# Patient Record
Sex: MALE | Race: BLACK OR AFRICAN AMERICAN | URBAN - METROPOLITAN AREA
[De-identification: names, ages, dates, MRNs, and addresses within clinical notes are randomized per-mention and may not be internally consistent; named-entity substitution may affect disease eponyms.]

---

## 2018-01-12 ENCOUNTER — HOSPITAL ENCOUNTER (OUTPATIENT)
Dept: RADIATION ONCOLOGY | Age: 68
Discharge: HOME | End: 2018-01-12
Payer: MEDICARE

## 2018-01-12 VITALS
SYSTOLIC BLOOD PRESSURE: 144 MMHG | OXYGEN SATURATION: 94 % | HEART RATE: 71 BPM | RESPIRATION RATE: 14 BRPM | BODY MASS INDEX: 26.1 KG/M2 | DIASTOLIC BLOOD PRESSURE: 106 MMHG | HEIGHT: 68 IN | TEMPERATURE: 97.6 F | WEIGHT: 172.2 LBS

## 2018-01-12 DIAGNOSIS — Z90.79 HISTORY OF RADICAL PROSTATECTOMY: ICD-10-CM

## 2018-01-12 DIAGNOSIS — C61 ADENOCARCINOMA OF PROSTATE (HCC): Primary | ICD-10-CM

## 2018-01-12 PROCEDURE — 99212 OFFICE O/P EST SF 10 MIN: CPT | Performed by: RADIOLOGY

## 2018-01-12 RX ORDER — LEVOTHYROXINE SODIUM 0.07 MG/1
75 TABLET ORAL DAILY
COMMUNITY

## 2018-01-17 PROCEDURE — 99214 OFFICE O/P EST MOD 30 MIN: CPT | Performed by: RADIOLOGY

## 2018-01-17 PROCEDURE — 77290 THER RAD SIMULAJ FIELD CPLX: CPT | Performed by: RADIOLOGY

## 2018-01-17 PROCEDURE — 77334 RADIATION TREATMENT AID(S): CPT | Performed by: RADIOLOGY

## 2018-01-24 PROCEDURE — 77338 DESIGN MLC DEVICE FOR IMRT: CPT | Performed by: RADIOLOGY

## 2018-01-24 PROCEDURE — 77301 RADIOTHERAPY DOSE PLAN IMRT: CPT | Performed by: RADIOLOGY

## 2018-01-24 PROCEDURE — 77300 RADIATION THERAPY DOSE PLAN: CPT | Performed by: RADIOLOGY

## 2018-01-25 ENCOUNTER — HOSPITAL ENCOUNTER (OUTPATIENT)
Dept: LAB | Age: 68
Discharge: HOME | End: 2018-01-25
Payer: MEDICARE

## 2018-01-25 LAB
ATYPICAL LYMPHOCYTE RELATIVE PERCENT: 3 %
BASOPHILS ABSOLUTE: 0 K/UL (ref 0–0.2)
BASOPHILS RELATIVE PERCENT: 0.2 %
EOSINOPHILS ABSOLUTE: 0.1 K/UL (ref 0–0.7)
EOSINOPHILS RELATIVE PERCENT: 2 %
HCT VFR BLD CALC: 41.9 % (ref 42–52)
HEMOGLOBIN: 14.1 G/DL (ref 14–18)
LYMPHOCYTES ABSOLUTE: 1 K/UL (ref 1–4.8)
LYMPHOCYTES RELATIVE PERCENT: 28 %
MCH RBC QN AUTO: 34.6 PG (ref 27–31.3)
MCHC RBC AUTO-ENTMCNC: 33.7 % (ref 33–37)
MCV RBC AUTO: 102.7 FL (ref 80–100)
MONOCYTES ABSOLUTE: 0.4 K/UL (ref 0.2–0.8)
MONOCYTES RELATIVE PERCENT: 10.7 %
NEUTROPHILS ABSOLUTE: 1.8 K/UL (ref 1.4–6.5)
NEUTROPHILS RELATIVE PERCENT: 56 %
PDW BLD-RTO: 13.2 % (ref 11.5–14.5)
PLATELET # BLD: 137 K/UL (ref 130–400)
PLATELET SLIDE REVIEW: NORMAL
PROSTATE SPECIFIC ANTIGEN: 0.05 NG/ML (ref 0–6.22)
RBC # BLD: 4.08 M/UL (ref 4.7–6.1)
RBC # BLD: NORMAL 10*6/UL
SMUDGE CELLS: 3.9
VACUOLATED NEUTROPHILS: ABNORMAL
WBC # BLD: 3.2 K/UL (ref 4.8–10.8)

## 2018-01-25 PROCEDURE — 84153 ASSAY OF PSA TOTAL: CPT

## 2018-01-25 PROCEDURE — 85025 COMPLETE CBC W/AUTO DIFF WBC: CPT

## 2018-01-25 PROCEDURE — 77387 GUIDANCE FOR RADJ TX DLVR: CPT | Performed by: RADIOLOGY

## 2018-01-25 PROCEDURE — 84403 ASSAY OF TOTAL TESTOSTERONE: CPT

## 2018-01-25 PROCEDURE — G0103 PSA SCREENING: HCPCS

## 2018-01-25 PROCEDURE — 77280 THER RAD SIMULAJ FIELD SMPL: CPT | Performed by: RADIOLOGY

## 2018-01-29 PROCEDURE — 77385 HC NTSTY MODUL RAD TX DLVR SMPL: CPT | Performed by: RADIOLOGY

## 2018-01-30 PROCEDURE — 77385 HC NTSTY MODUL RAD TX DLVR SMPL: CPT | Performed by: RADIOLOGY

## 2018-01-30 PROCEDURE — 99212 OFFICE O/P EST SF 10 MIN: CPT | Performed by: RADIOLOGY

## 2018-01-31 PROCEDURE — 77385 HC NTSTY MODUL RAD TX DLVR SMPL: CPT | Performed by: RADIOLOGY

## 2018-02-01 ENCOUNTER — HOSPITAL ENCOUNTER (OUTPATIENT)
Dept: RADIATION ONCOLOGY | Age: 68
Discharge: HOME | End: 2018-02-01
Payer: MEDICARE

## 2018-02-01 PROCEDURE — 77385 HC NTSTY MODUL RAD TX DLVR SMPL: CPT | Performed by: RADIOLOGY

## 2018-02-02 PROCEDURE — 77385 HC NTSTY MODUL RAD TX DLVR SMPL: CPT | Performed by: RADIOLOGY

## 2018-02-02 PROCEDURE — 77336 RADIATION PHYSICS CONSULT: CPT | Performed by: RADIOLOGY

## 2018-02-05 PROCEDURE — 77385 HC NTSTY MODUL RAD TX DLVR SMPL: CPT | Performed by: RADIOLOGY

## 2018-02-06 PROCEDURE — 99212 OFFICE O/P EST SF 10 MIN: CPT | Performed by: RADIOLOGY

## 2018-02-06 PROCEDURE — 77385 HC NTSTY MODUL RAD TX DLVR SMPL: CPT | Performed by: RADIOLOGY

## 2018-02-07 PROCEDURE — 77385 HC NTSTY MODUL RAD TX DLVR SMPL: CPT | Performed by: RADIOLOGY

## 2018-02-08 PROCEDURE — 77385 HC NTSTY MODUL RAD TX DLVR SMPL: CPT | Performed by: RADIOLOGY

## 2018-02-09 PROCEDURE — 77336 RADIATION PHYSICS CONSULT: CPT | Performed by: RADIOLOGY

## 2018-02-09 PROCEDURE — 77385 HC NTSTY MODUL RAD TX DLVR SMPL: CPT | Performed by: RADIOLOGY

## 2018-02-12 PROCEDURE — 77385 HC NTSTY MODUL RAD TX DLVR SMPL: CPT | Performed by: RADIOLOGY

## 2018-02-13 PROCEDURE — 99212 OFFICE O/P EST SF 10 MIN: CPT | Performed by: RADIOLOGY

## 2018-02-13 PROCEDURE — 77385 HC NTSTY MODUL RAD TX DLVR SMPL: CPT | Performed by: RADIOLOGY

## 2018-02-14 PROCEDURE — 77385 HC NTSTY MODUL RAD TX DLVR SMPL: CPT | Performed by: RADIOLOGY

## 2018-02-15 PROCEDURE — 77300 RADIATION THERAPY DOSE PLAN: CPT | Performed by: RADIOLOGY

## 2018-02-15 PROCEDURE — 77385 HC NTSTY MODUL RAD TX DLVR SMPL: CPT | Performed by: RADIOLOGY

## 2018-02-16 PROCEDURE — 77336 RADIATION PHYSICS CONSULT: CPT | Performed by: RADIOLOGY

## 2018-02-16 PROCEDURE — 77385 HC NTSTY MODUL RAD TX DLVR SMPL: CPT | Performed by: RADIOLOGY

## 2018-02-19 PROCEDURE — 77385 HC NTSTY MODUL RAD TX DLVR SMPL: CPT | Performed by: RADIOLOGY

## 2018-02-20 PROCEDURE — 77385 HC NTSTY MODUL RAD TX DLVR SMPL: CPT | Performed by: RADIOLOGY

## 2018-02-20 PROCEDURE — 99212 OFFICE O/P EST SF 10 MIN: CPT | Performed by: RADIOLOGY

## 2018-02-21 PROCEDURE — 77385 HC NTSTY MODUL RAD TX DLVR SMPL: CPT | Performed by: RADIOLOGY

## 2018-02-22 PROCEDURE — 77385 HC NTSTY MODUL RAD TX DLVR SMPL: CPT | Performed by: RADIOLOGY

## 2018-02-23 PROCEDURE — 77385 HC NTSTY MODUL RAD TX DLVR SMPL: CPT | Performed by: RADIOLOGY

## 2018-02-23 PROCEDURE — 77336 RADIATION PHYSICS CONSULT: CPT | Performed by: RADIOLOGY

## 2018-02-26 PROCEDURE — 77385 HC NTSTY MODUL RAD TX DLVR SMPL: CPT | Performed by: RADIOLOGY

## 2018-02-27 PROCEDURE — 77385 HC NTSTY MODUL RAD TX DLVR SMPL: CPT | Performed by: RADIOLOGY

## 2018-02-28 PROCEDURE — 77385 HC NTSTY MODUL RAD TX DLVR SMPL: CPT | Performed by: RADIOLOGY

## 2018-02-28 PROCEDURE — 99212 OFFICE O/P EST SF 10 MIN: CPT | Performed by: RADIOLOGY

## 2018-03-01 ENCOUNTER — HOSPITAL ENCOUNTER (OUTPATIENT)
Dept: RADIATION ONCOLOGY | Age: 68
Discharge: HOME | End: 2018-03-01
Payer: MEDICARE

## 2018-03-01 PROCEDURE — 77385 HC NTSTY MODUL RAD TX DLVR SMPL: CPT | Performed by: RADIOLOGY

## 2018-03-02 PROCEDURE — 77385 HC NTSTY MODUL RAD TX DLVR SMPL: CPT | Performed by: RADIOLOGY

## 2018-03-02 PROCEDURE — 77336 RADIATION PHYSICS CONSULT: CPT | Performed by: RADIOLOGY

## 2018-03-05 ENCOUNTER — HOSPITAL ENCOUNTER (OUTPATIENT)
Dept: RADIATION ONCOLOGY | Age: 68
Discharge: HOME | End: 2018-03-05
Payer: MEDICARE

## 2018-03-05 PROCEDURE — 77385 HC NTSTY MODUL RAD TX DLVR SMPL: CPT | Performed by: RADIOLOGY

## 2018-03-06 PROCEDURE — 99212 OFFICE O/P EST SF 10 MIN: CPT | Performed by: RADIOLOGY

## 2018-03-06 PROCEDURE — 77385 HC NTSTY MODUL RAD TX DLVR SMPL: CPT | Performed by: RADIOLOGY

## 2018-03-07 PROCEDURE — 77385 HC NTSTY MODUL RAD TX DLVR SMPL: CPT | Performed by: RADIOLOGY

## 2018-03-08 PROCEDURE — 77385 HC NTSTY MODUL RAD TX DLVR SMPL: CPT | Performed by: RADIOLOGY

## 2018-03-09 PROCEDURE — 77385 HC NTSTY MODUL RAD TX DLVR SMPL: CPT | Performed by: RADIOLOGY

## 2018-03-09 PROCEDURE — 77336 RADIATION PHYSICS CONSULT: CPT | Performed by: RADIOLOGY

## 2018-03-12 PROCEDURE — 77385 HC NTSTY MODUL RAD TX DLVR SMPL: CPT | Performed by: RADIOLOGY

## 2018-03-13 PROCEDURE — 77385 HC NTSTY MODUL RAD TX DLVR SMPL: CPT | Performed by: RADIOLOGY

## 2018-03-13 PROCEDURE — 99212 OFFICE O/P EST SF 10 MIN: CPT | Performed by: RADIOLOGY

## 2018-03-14 PROCEDURE — 77385 HC NTSTY MODUL RAD TX DLVR SMPL: CPT | Performed by: RADIOLOGY

## 2018-03-15 PROCEDURE — 77385 HC NTSTY MODUL RAD TX DLVR SMPL: CPT | Performed by: RADIOLOGY

## 2018-03-16 PROCEDURE — 77336 RADIATION PHYSICS CONSULT: CPT | Performed by: RADIOLOGY

## 2018-03-16 PROCEDURE — 77385 HC NTSTY MODUL RAD TX DLVR SMPL: CPT | Performed by: RADIOLOGY

## 2018-03-19 PROCEDURE — 77385 HC NTSTY MODUL RAD TX DLVR SMPL: CPT | Performed by: RADIOLOGY

## 2018-03-20 PROCEDURE — 99213 OFFICE O/P EST LOW 20 MIN: CPT | Performed by: RADIOLOGY

## 2018-03-20 PROCEDURE — 77385 HC NTSTY MODUL RAD TX DLVR SMPL: CPT | Performed by: RADIOLOGY

## 2018-03-21 PROCEDURE — 77385 HC NTSTY MODUL RAD TX DLVR SMPL: CPT | Performed by: RADIOLOGY

## 2018-03-22 PROCEDURE — 77385 HC NTSTY MODUL RAD TX DLVR SMPL: CPT | Performed by: RADIOLOGY

## 2018-03-27 PROCEDURE — 77336 RADIATION PHYSICS CONSULT: CPT | Performed by: RADIOLOGY

## 2018-03-29 ENCOUNTER — HOSPITAL ENCOUNTER (OUTPATIENT)
Dept: LAB | Age: 68
Discharge: HOME | End: 2018-03-29
Payer: MEDICARE

## 2018-03-29 LAB
ANISOCYTOSIS: ABNORMAL
BASOPHILS ABSOLUTE: 0 K/UL (ref 0–0.2)
BASOPHILS RELATIVE PERCENT: 0.6 %
EOSINOPHILS ABSOLUTE: 0 K/UL (ref 0–0.7)
EOSINOPHILS RELATIVE PERCENT: 1.8 %
HCT VFR BLD CALC: 37.4 % (ref 42–52)
HEMOGLOBIN: 12.6 G/DL (ref 14–18)
LYMPHOCYTES ABSOLUTE: 0.7 K/UL (ref 1–4.8)
LYMPHOCYTES RELATIVE PERCENT: 32 %
MACROCYTES: ABNORMAL
MCH RBC QN AUTO: 35.6 PG (ref 27–31.3)
MCHC RBC AUTO-ENTMCNC: 33.8 % (ref 33–37)
MCV RBC AUTO: 105.4 FL (ref 80–100)
MONOCYTES ABSOLUTE: 0.4 K/UL (ref 0.2–0.8)
MONOCYTES RELATIVE PERCENT: 17.5 %
NEUTROPHILS ABSOLUTE: 1.1 K/UL (ref 1.4–6.5)
NEUTROPHILS RELATIVE PERCENT: 48.1 %
PDW BLD-RTO: 13.6 % (ref 11.5–14.5)
PLATELET # BLD: 131 K/UL (ref 130–400)
PLATELET SLIDE REVIEW: ADEQUATE
RBC # BLD: 3.54 M/UL (ref 4.7–6.1)
SLIDE REVIEW: ABNORMAL
VACUOLATED NEUTROPHILS: PRESENT
WBC # BLD: 2.3 K/UL (ref 4.8–10.8)

## 2018-03-29 PROCEDURE — 85025 COMPLETE CBC W/AUTO DIFF WBC: CPT

## 2018-06-21 ENCOUNTER — HOSPITAL ENCOUNTER (OUTPATIENT)
Dept: RADIATION ONCOLOGY | Age: 68
Discharge: HOME | End: 2018-06-21
Payer: MEDICARE

## 2018-06-21 VITALS
HEART RATE: 79 BPM | OXYGEN SATURATION: 98 % | DIASTOLIC BLOOD PRESSURE: 80 MMHG | TEMPERATURE: 97.5 F | SYSTOLIC BLOOD PRESSURE: 129 MMHG | HEIGHT: 68 IN | RESPIRATION RATE: 18 BRPM

## 2018-06-21 DIAGNOSIS — Z90.79 HISTORY OF RADICAL PROSTATECTOMY: Primary | ICD-10-CM

## 2018-06-21 DIAGNOSIS — C61 ADENOCARCINOMA OF PROSTATE (HCC): ICD-10-CM

## 2018-06-21 PROCEDURE — 99213 OFFICE O/P EST LOW 20 MIN: CPT | Performed by: RADIOLOGY

## 2019-07-08 ENCOUNTER — HOSPITAL ENCOUNTER (OUTPATIENT)
Dept: LAB | Age: 69
Discharge: HOME | End: 2019-07-08
Payer: MEDICARE

## 2019-07-08 LAB — PROSTATE SPECIFIC ANTIGEN: 0.01 NG/ML (ref 0–6.22)

## 2019-07-08 PROCEDURE — 84153 ASSAY OF PSA TOTAL: CPT

## 2019-07-15 ENCOUNTER — HOSPITAL ENCOUNTER (OUTPATIENT)
Dept: RADIATION ONCOLOGY | Age: 69
Discharge: HOME | End: 2019-07-15
Payer: MEDICARE

## 2019-07-15 VITALS
DIASTOLIC BLOOD PRESSURE: 86 MMHG | RESPIRATION RATE: 14 BRPM | HEART RATE: 66 BPM | TEMPERATURE: 96.2 F | SYSTOLIC BLOOD PRESSURE: 128 MMHG | BODY MASS INDEX: 25.51 KG/M2 | WEIGHT: 167.8 LBS

## 2019-07-15 DIAGNOSIS — C61 ADENOCARCINOMA OF PROSTATE (HCC): Primary | ICD-10-CM

## 2019-07-15 PROCEDURE — 99212 OFFICE O/P EST SF 10 MIN: CPT | Performed by: RADIOLOGY

## 2019-07-15 NOTE — ONCOLOGY
RADIATION ONCOLOGY FOLLOW-UP    DATE OF VISIT: 7/15/2019    Patient Active Problem List   Diagnosis Code    Adenocarcinoma of prostate ([facility] [address] C61    Pure hypercholesterolemia E78.00    History of colonic polyps Z86.010    Senile cataracts of both eyes H25.9    History of radical prostatectomy Z90.79    Vitreous floaters of both eyes H38.65    Diverticulosis of colon K57.30    Prediabetes R73.03       DIAGNOSIS: [place] 4+3=7 PSA 7, [id number]. PSA never vanished, now slowly increasing to 0.26.  Lupron started 11/17.      PRIOR TREATMENT: 70.2 Gy delivered at 1.8 Gy per day, with concurrent Lupron (6 months duration). Treatment Dates:  1/29/18 - 3/22/18     TIME SINCE TREATMENT: 16 months.     INTERVAL HISTORY: Patient continues to do well, with a stable IPSS score of 3, with nocturia ×1 and some occasional urgency. No dysuria or hematuria. No urinary incontinence, and the patient no longer uses pads or diapers. PSA [place] of [place] was less than 0.01. Recent PSA at [facility] was 0.01. I would repeat this in 6 months before deciding excepting it as real.  No rectal bleeding or other GI symptoms.     PAST MEDICAL HISTORY:   Past Medical History:   Diagnosis Date    Adenocarcinoma of prostate ([facility] [address]     [place] score = 7 (9/2010)    Diverticulosis large intestine w/o perforation or abscess w/bleeding     Diverticulosis of colon     5/12/2011     GERD (gastroesophageal reflux disease)     History of colonic polyps     5/2011, 8/19/14 (repeat in 3-5 years)    History of radical prostatectomy     Dec 2010    History of tobacco use     Quit at age 23    Hyperlipidemia 11/1/2010    Simvastatin 40 mg qd, ASA 81 mg qd     Hypothyroidism     Leukopenia     WBC = 3.3 (chronic)    Prediabetes 6/13/2015    Hgb A1c = 6.l (6/2015)     Type 2 diabetes mellitus without complication ([facility] [address]     borderline       PAST SURGICAL HISTORY:  Past Surgical History:   Procedure Laterality Date    COLONOSCOPY  2014    HERNIA REPAIR Left     inguinal    PROSTATE SURGERY  2010    robotic prostatectomy      [facility]        Social History     Tobacco Use   Smoking Status Former Smoker    Packs/day: 0.50    Years: 10.00    Pack years: 5.00    Last attempt to quit: 1974    Years since quittin.5   Smokeless Tobacco Never Used     Social History     Substance and Sexual Activity   Alcohol Use No       ALLERGIES:   Allergies   Allergen Reactions    Penicillins Hives        CURRENT MEDICATIONS:     Prior to Admission medications    Medication Sig Start Date End Date Taking? Authorizing Provider   Sildenafil Citrate (VIAGRA PO) Take 1 tablet by mouth as needed   Yes Historical Provider, MD   levothyroxine (SYNTHROID) 75 MCG tablet Take 75 mcg by mouth Daily   Yes Historical Provider, MD   simvastatin (ZOCOR) 40 MG tablet Take 1 tablet by mouth nightly (for cholesterol) 16  Yes Alan Perry MD   aspirin EC 81 MG EC tablet Take 1 tablet by mouth daily to prevent heart attack and stroke 6/11/15  Yes Alan Perry MD     ECOG PERFORMANCE STATUS: 0    VITAL SIGNS:    Vitals:    07/15/19 1156   BP: 128/86   Pulse: 66   Resp: 14   Temp: 96.2 °F (35.7 °C)   Weight: 167 lb 12.8 oz (76.1 kg)     PHYSICAL EXAMINATION:  GENERAL: No acute distress. Alert, oriented, cooperative. HEENT:  PERRLA, EOMI. Oral cavity WNL. NECK:  No cervical or supraclavicular adenopathy. CHEST/LUNGS: CTA, no rib or spine tenderness. CARDIOVASCULAR:  RRR, no audible murmur  ABDOMEN:  Nontender, nondistended, normal bowel sounds, soft, no masses  EXTREMITIES: No C/C/E   RECTAL: deferred. STUDIES: PSA 0.01, [facility], 19    IMPRESSION/PLAN:  PSA at the lower limit of detection. No significant symptoms related to treatment. I recommend the patient continue checking his PSA every 6 months, and do this at the same lab for consistency (at [facility] with his PMD). I will see him back in one year.       Electronically signed by Carmenza Webster MD on 7/15/19 at

## 2020-07-22 ENCOUNTER — HOSPITAL ENCOUNTER (OUTPATIENT)
Dept: LAB | Age: 70
Discharge: HOME | End: 2020-07-22
Payer: MEDICARE

## 2020-07-22 ENCOUNTER — HOSPITAL ENCOUNTER (OUTPATIENT)
Dept: RADIATION ONCOLOGY | Age: 70
Discharge: HOME | End: 2020-07-22
Payer: MEDICARE

## 2020-07-22 LAB — PROSTATE SPECIFIC ANTIGEN: 0.01 NG/ML (ref 0–6.22)

## 2020-07-29 ENCOUNTER — HOSPITAL ENCOUNTER (OUTPATIENT)
Dept: RADIATION ONCOLOGY | Age: 70
Discharge: HOME | End: 2020-07-29
Payer: MEDICARE

## 2020-07-29 VITALS
TEMPERATURE: 99.5 F | DIASTOLIC BLOOD PRESSURE: 85 MMHG | BODY MASS INDEX: 25.32 KG/M2 | HEART RATE: 85 BPM | WEIGHT: 166.5 LBS | RESPIRATION RATE: 16 BRPM | SYSTOLIC BLOOD PRESSURE: 127 MMHG

## 2020-07-29 PROCEDURE — 99212 OFFICE O/P EST SF 10 MIN: CPT | Performed by: RADIOLOGY

## 2020-07-29 NOTE — ONCOLOGY
RADIATION ONCOLOGY FOLLOW-UP    DATE OF VISIT: 2020    DIAGNOSIS & STAGING: Cheryl 4+3=7 PSA 7, [id number]. PSA never vanished, now slowly increasing to 0.26.  Lupron started .      PRIOR TREATMENT: 70.2 Gy delivered at 1.8 Gy per day, with concurrent Lupron (6 months duration).  Treatment Dates:  18 - 3/22/18     TIME SINCE TREATMENT: 2.5 years     INTERVAL HISTORY: Patient returns today with a persistently low PSA of 0.01, at the lower limit of the assay at [facility]. In  he had a PSA at [facility] that was less than 0.01.  No dysuria or hematuria.  He notes mild stress incontinence, no longer using pads or diapers. No hematuria or rectal bleeding. No dysuria. He primarily follows at the [address] [place] [address], no longer sees Urology.           PAST MEDICAL HISTORY:   Past Medical History:   Diagnosis Date    Adenocarcinoma of prostate ([facility] [address]     [place] score = 7 (2010)    Diverticulosis large intestine w/o perforation or abscess w/bleeding     Diverticulosis of colon     2011     GERD (gastroesophageal reflux disease)     History of colonic polyps     2011, 14 (repeat in 3-5 years)    History of radical prostatectomy     Dec 2010    History of tobacco use     Quit at age 23    Hyperlipidemia 2010    Simvastatin 40 mg qd, ASA 81 mg qd     Hypothyroidism     Leukopenia     WBC = 3.3 (chronic)    Prediabetes 2015    Hgb A1c = 6.l (2015)     Type 2 diabetes mellitus without complication ([facility] [address]     borderline       PAST SURGICAL HISTORY:  Past Surgical History:   Procedure Laterality Date    COLONOSCOPY      HERNIA REPAIR Left 2000    inguinal    PROSTATE SURGERY  2010    robotic prostatectomy      [facility]        Social History     Tobacco Use   Smoking Status Former Smoker    Packs/day: 0.50    Years: 10.00    Pack years: 5.00    Last attempt to quit: 1974    Years since quittin.6   Smokeless Tobacco Never Used     Social History     Substance and Sexual Activity   Alcohol CARDIOVASCULAR:  RRR, no audible murmur  ABDOMEN:  Nontender, nondistended, normal bowel sounds, soft, no masses  EXTREMITIES: No C/C/E   RECTAL: deferred. Nearly vanishing PSA    STUDIES: PSA 0.01 on 7/22/20, same as 6m/a    IMPRESSION/PLAN:    It is unclear what the barely detectable PSA means. If remains at this low level indefinitely the patient can be considered free of disease. I will see him back in 6 months. PSA is being done through his other physicians. Electronically signed by Ildefonso Lara MD on 7/29/20 at 2:26 PM EDT      Thank you for allowing us to participate in the care of this patient.   cc:   No att. providers found  Mary Gongora, APRN - [address], MD  [place] [facility]

## 2020-07-29 NOTE — ONCOLOGY
NURSING ASSESSMENT     Date: 7/29/2020        Patient Name: Jesusita Webster     YOB: 1950      Age:  68 y.o. MRN: [id number]     Chaperone [] Yes   [x] No      Advance Directives:   Do you currently have completed advance directives (living will)? [] Yes   [x] No         *If yes, please bring us a copy for your records. *If no, would you like info or assistance in completing advance directives (living will)? [] Yes   [x] No    Pain Score: 0      Is pain affecting your ability to take care of yourself or move throughout your home?                         [] Yes   [x] No    General:   Patient has gained weight [] Yes   [x] No  Patient has lost weight [] Yes   [x] No  How much weight in pounds and over what length of time:     Eyes (Ophthalmic): denies     Skin (Dermatological): denies     ENT: denies     Respiratory: denies     Cardiovascular: denies      Device   [] Yes   [x] No   Copy of Card Obtained [] Yes   [x] No    Gastrointestinal: denies  Genito-Urinary: IPPS  Breast: denies   Musculoskeletal: denies  Neurological: denies    Hematological and Lymphatic: denies   Endocrine: synthroid    [facility]-and work provides a NP.    PSA 0.01  7/22/20

## 2021-07-28 ENCOUNTER — HOSPITAL ENCOUNTER (OUTPATIENT)
Dept: RADIATION ONCOLOGY | Age: 71
Discharge: HOME | End: 2021-07-28
Payer: MEDICARE

## 2021-07-28 VITALS
HEART RATE: 85 BPM | WEIGHT: 167.6 LBS | DIASTOLIC BLOOD PRESSURE: 80 MMHG | OXYGEN SATURATION: 99 % | BODY MASS INDEX: 25.4 KG/M2 | SYSTOLIC BLOOD PRESSURE: 126 MMHG | TEMPERATURE: 97.8 F | HEIGHT: 68 IN | RESPIRATION RATE: 16 BRPM

## 2021-07-28 PROCEDURE — 99212 OFFICE O/P EST SF 10 MIN: CPT | Performed by: RADIOLOGY

## 2021-07-28 PROCEDURE — 99214 OFFICE O/P EST MOD 30 MIN: CPT | Performed by: RADIOLOGY

## 2021-07-28 RX ORDER — SILDENAFIL 100 MG/1
50 TABLET, FILM COATED ORAL PRN
COMMUNITY
End: 2022-07-27

## 2021-07-28 NOTE — PROGRESS NOTES
DEPARTMENT OF RADIATION ONCOLOGY   Follow up visit        2021    NAME:  Aurea Dickey    :  1950 65 y.o. male     PCP: ANUSHA Greenberg CNP    REFERRING PROVIDER: Lata    DIAGNOSIS & STAGING: Cheryl 4+3=7 PSA 7, [id number]. PSA never vanished after 2010 prostatectomy with extra-prostatic extension and extensive positive margins, PSA slowly increasing to 0.26 in 2017, Lupron starting then.      PRIOR TREATMENT: 70.2 Gy delivered at 1.8 Gy per day, with concurrent Lupron (6 months duration).  Treatment Dates:  18 - 3/22/18    RECENT HISTORY: Aurea Dickey returns for follow up for the above diagnosis and treatment history. He was last seen here on 2020, with 2020 PSA being 0.01. The patient is no longer on Lupron, and 2021, PSA at the [facility] was less than 0.04. His IPSS score is 3. He reports occasional Viagra, which he would use more frequently if it were not for the side effect of headaches. He has urinary urgency, and occasional rectal urgency. Past medical, surgical, social and family histories reviewed and updated as indicated. ALLERGIES:  Penicillins       MEDICATIONS:   Current Outpatient Medications:     sildenafil (VIAGRA) 100 MG tablet, Take 50 mg by mouth as needed for Erectile Dysfunction, Disp: , Rfl:     levothyroxine (SYNTHROID) 75 MCG tablet, Take 75 mcg by mouth Daily, Disp: , Rfl:     simvastatin (ZOCOR) 40 MG tablet, Take 1 tablet by mouth nightly (for cholesterol), Disp: 90 tablet, Rfl: 1    REVIEW OF SYSTEMS:  Obtained from the patient, chart review and nursing assessment. ECOG Performance Status 0  Constitutional: Good appetite, no weight loss. HEENT:  No headache or visual symptoms but he has bilateral tinnitus. Skin: He reports having an appointment with dermatology to evaluate a rash. Respiratory:  No cough or hemoptysis.   Cardiovascular:  No chest pain, orthopnea, or paroxysmal nocturnal dyspnea  GI:  No incontinence, hematochezia, or diarrhea, but he has occasional rectal urgency. :  No incontinence, hematuria, or dysuria, but he has urinary urgency. Lymph:  No edema  Neuro:  No pain, paresis, or paresthesia  Psychiatric:  No psychiatric illness    PHYSICAL EXAMINATION:      Vitals:    07/28/21 1507   BP: 126/80   Pulse: 85   Resp: 16   Temp: 97.8 °F (36.6 °C)   SpO2: 99%   Weight: 167 lb 9.6 oz (76 kg)   Height: 5' 8\" (1.727 m)       Wt Readings from Last 3 Encounters:   07/28/21 167 lb 9.6 oz (76 kg)   07/29/20 166 lb 8 oz (75.5 kg)   07/15/19 167 lb 12.8 oz (76.1 kg)       ECOG PERFORMANCE STATUS:  0    Constitutional: 65 y.o. male who is alert, cooperative and in no apparent distress. HEENT:   No jaundice or icterus. Pupils are equal and reactive. Cranial nerves II-XII are grossly intact. Lymph: No palpable adenopathy in the neck, supraclavicular, infraclavicular, or axillary regions. Heart Exam shows regular rate and rhythm without murmurs, rubs, or gallop. Lungs  are clear to auscultation. Abdomen exam shows a non-distended non-tender abdomen with positive bowel sounds. Extremities:   No edema, clubbing, or cyanosis. Neuro Exam:  Shows strength 5/5 proximally and distally in all 4 extremities, and sensory is grossly normal for light touch and deep pressure. Rectal exam: Shows no external hemorrhoids, normal sphincter tone, an empty prostate bed with no suspiciously palpable lesions. ASSESSMENT/PLAN: The patient is doing well, clinically CAIN, with undetectable PSA. The patient reports that his PSA evaluations are performed at the [facility].  I have asked him to return for follow-up here in approximately 1 year.       Dory Colon MD PhD  Radiation Oncologist  Diplomate, American Board of Radiology -- Radiation Oncology    Department of [address] [place] [address] -- [facility]  [address] [facility]  [address] [address] [place]

## 2021-07-28 NOTE — PROGRESS NOTES
NURSING ASSESSMENT     Date: 7/28/2021        Patient Name: Randall Zazueta     YOB: 1950      Age:  65 y.o. MRN: [id number]     Chaperone [] Yes   [x] No      Advance Directives:   Do you currently have completed advance directives (living will)? [x] Yes   [] No         *If yes, please bring us a copy for your records. *If no, would you like info or assistance in completing advance directives (living will)? [] Yes   [x] No    Pain Score:   Pain Score (1-10): none     General: No Problems  Patient has gained weight [] Yes   [x] No  Patient has lost weight [] Yes   [x] No  How much weight in pounds and over what length of time:     Eyes (Ophthalmic): wears bifocals     Skin (Dermatological): Rash across chest, has a dermatology appt in a month at [facility]      ENT: has bilateral ringing in ears     Respiratory: No Problems     Cardiovascular: No Problems      Device   [] Yes   [x] No   Copy of Card Obtained [] Yes   [x] No    Gastrointestinal: occasional urgency of stool     Genito-Urinary: IPSS =3, has urgency of urination, takes viagra prn     Breast: No Problems     Musculoskeletal: No Problems    Neurological: No Problems, has headaches at times after taking viagra      Hematological and Lymphatic: No Problems     Endocrine: Diabetes Mellitus and Thyroid Problems, diet controlled DM and takes synthroid for hypothroid    A 10-point review of systems has been conducted and pertinent positives have been   recorded. All other review of systems are negative    Was the patient admitted during the course of treatment OR within 30 days of treatment?  no    Additional Comments: PSA done 7/22/21 was 0.01

## 2022-07-27 ENCOUNTER — HOSPITAL ENCOUNTER (OUTPATIENT)
Dept: RADIATION ONCOLOGY | Age: 72
Discharge: HOME | End: 2022-07-27
Payer: MEDICARE

## 2022-07-27 VITALS
BODY MASS INDEX: 24.24 KG/M2 | SYSTOLIC BLOOD PRESSURE: 124 MMHG | HEART RATE: 81 BPM | WEIGHT: 159.4 LBS | DIASTOLIC BLOOD PRESSURE: 79 MMHG | OXYGEN SATURATION: 99 % | RESPIRATION RATE: 16 BRPM | TEMPERATURE: 98.2 F

## 2022-07-27 DIAGNOSIS — C61 ADENOCARCINOMA OF PROSTATE (HCC): Primary | ICD-10-CM

## 2022-07-27 PROCEDURE — 99212 OFFICE O/P EST SF 10 MIN: CPT | Performed by: RADIOLOGY

## 2022-07-27 PROCEDURE — 99213 OFFICE O/P EST LOW 20 MIN: CPT | Performed by: RADIOLOGY

## 2022-07-27 NOTE — PROGRESS NOTES
NURSING ASSESSMENT     Date: 7/27/2022        Patient Name: Coleen Mclaughlin     YOB: 1950      Age:  66 y.o. MRN: [id number]       Chaperone [] Yes   [x] No      Advance Directives:   Do you currently have completed advance directives (living will)? [x] Yes   [] No         *If yes, please bring us a copy for your records. *If no, would you like info or assistance in completing advance directives (living will)? [] Yes   [x] No    Pain Score:   Pain Score (1-10): Denies   Pain Location: denies   Pain Duration: n/a   Pain Management/Control: n/a      Is pain affecting your ability to take care of yourself or move throughout your home? [] Yes   [x] No    General: No Problems  Patient has gained weight [] Yes   [x] No  Patient has lost weight [x] Yes   [] No  How much weight in pounds and over what length of time: Down 8 lbs since last visit 7/28/21    Eyes (Ophthalmic): Wears glasses     Skin (Dermatological): No Problems     ENT: Difficulty Swallowing/Chewing when eats too fast. Ringing in both ears for couple years. Respiratory: No Problems     Cardiovascular: No Problems      Device   [] Yes   [x] No   Copy of Card Obtained [] Yes   [x] No    Gastrointestinal: No Problems    Genito-Urinary:  See IPSS sheet      Urinary leakage if doesn't get to the bathroom fast enough   Nocturia X 2    Impotence       Breast: No Problems     Musculoskeletal: Back Pain after physical activity on occasion. Ongoing pulled muscle to left calf with activity at times. Neurological: Tingling to left hand on occasion      Hematological and Lymphatic: No Problems     Endocrine: Thyroid Problems, pre-diabetes        A 10-point review of systems  has been conducted and pertinent positives have been   recorded. All other review of systems are negative    Was the patient admitted during the course of treatment OR within 30 days of treatment?  No        Additional Comments: Last PSA at the [facility] 5/10/22 < 0.04

## 2022-09-03 NOTE — PROGRESS NOTES
[facility]           Radiation Oncology      [address] [facility]        [address]        Jillian Stauffer: [phone]        F: [phone]       [email or web address]                   Dr. Alona Jones MD PhD    FOLLOW-UP NOTE     Date of Service: 2022  Patient ID: Chente Renteria   : 1950  MRN: [id number]   Acct Number: [de-identified]       NAME:  Chente Renteria    :  1950 74 y.o. male     PCP: ANUSHA Diaz - CNP    REFERRING PROVIDER: Margarita Hughes    DIAGNOSIS:  1. Adenocarcinoma of prostate ([facility] [address]        STAGING: Cancer Staging  Adenocarcinoma of prostate [facility])  Staging form: Prostate, AJCC 7th Edition  - Pathologic: T3, N0, cM0 - Signed by Alona Jones MD on 9/3/2022      DIAGNOSIS & STAGING: [place] 4+3=7 PSA 7, [id number]. PSA never vanished after 2010 prostatectomy with extra-prostatic extension and extensive positive margins, PSA slowly increasing to 0.26 in 2017, Lupron starting then. PRIOR TREATMENT: 70.2 Gy delivered at 1.8 Gy per day, with concurrent Lupron (6 months duration). Treatment Dates:  18 - 3/22/18     RECENT HISTORY: Chente Renteria returns for follow up for the above diagnosis and treatment history. His most recent PSA on 5/10/2020 was less than 0.04 and was done at the [place]. Symptomatically he notes today some incontinence if he does not make it to the restroom in time. He does not wear any pads daily. He notes this urge incontinence only a few times per week. He otherwise has some erectile dysfunction which has been a longstanding problem for him. This is particularly distressing for him. He denies any blood in the stool, diarrhea, or constipation. Past medical, surgical, social and family histories reviewed and updated as indicated.     ALLERGIES:  Penicillins       MEDICATIONS:   Current Outpatient Medications:     levothyroxine (SYNTHROID) 75 MCG tablet, Take 75 mcg by mouth Daily, Disp: , Rfl:     simvastatin (ZOCOR) 40 MG tablet, Take 1 tablet by mouth nightly (for cholesterol), Disp: 90 tablet, Rfl: 1    Review of Systems   All other systems reviewed and are negative. PHYSICAL EXAMINATION:      Vitals:    07/27/22 1127   BP: 124/79   Pulse: 81   Resp: 16   Temp: 98.2 °F (36.8 °C)   SpO2: 99%   Weight: 159 lb 6.4 oz (72.3 kg)       Wt Readings from Last 3 Encounters:   07/27/22 159 lb 6.4 oz (72.3 kg)   07/28/21 167 lb 9.6 oz (76 kg)   07/29/20 166 lb 8 oz (75.5 kg)       ECOG PERFORMANCE STATUS:  0     Constitutional: 65 y.o. male who is alert, cooperative and in no apparent distress. HEENT:   No jaundice or icterus. Pupils are equal and reactive. Cranial nerves II-XII are grossly intact. Lymph: No palpable adenopathy in the neck, supraclavicular, infraclavicular, or axillary regions. Heart Exam shows regular rate and rhythm without murmurs, rubs, or gallop. Lungs  are clear to auscultation. Abdomen exam shows a non-distended non-tender abdomen with positive bowel sounds. Extremities:   No edema, clubbing, or cyanosis. Neuro Exam:  Shows strength 5/5 proximally and distally in all 4 extremities, and sensory is grossly normal for light touch and deep pressure. Rectal exam: Shows no external hemorrhoids, normal sphincter tone, an empty prostate bed with no suspiciously palpable lesions. ASSESSMENT/PLAN: This is a 70-year-old gentleman with a history of pT3 N0 M0 Bowen 7 (4+3) prostate adenocarcinoma with a pretreatment PSA of 7. He underwent radical prostatectomy followed by radiation therapy to the PTV fossa and residual seminal vesicle, PTV [place]oss[place] boost to 7020 centigrade completed on 3/22/2018. He returns for routine follow-up. His PSA is still quite low with a value of less than 0.04 which is consistent with prior values. I reviewed that this is quite reassuring and we will have him return in 1 year for a routine follow-up with a repeat PSA.   He will likely get this drawn at the [facility] where he gets the majority of his care. ORDERS: Serum PSA    FOLLOW-UP: Return for follow-up in 1 year.     Edy Hernandez MD PhD  Radiation Oncologist, [facility]

## 2023-07-12 ENCOUNTER — HOSPITAL ENCOUNTER (OUTPATIENT)
Dept: LAB | Age: 73
Discharge: HOME | End: 2023-07-12
Payer: MEDICARE

## 2023-07-12 LAB — PSA SERPL-MCNC: <0.01 NG/ML (ref 0–4)

## 2023-07-12 PROCEDURE — 84153 ASSAY OF PSA TOTAL: CPT

## 2023-07-12 PROCEDURE — 36415 COLL VENOUS BLD VENIPUNCTURE: CPT

## 2023-07-25 ENCOUNTER — HOSPITAL ENCOUNTER (OUTPATIENT)
Dept: RADIATION ONCOLOGY | Age: 73
Discharge: HOME | End: 2023-07-25
Payer: MEDICARE

## 2023-07-25 VITALS
TEMPERATURE: 97.3 F | WEIGHT: 161.2 LBS | BODY MASS INDEX: 24.51 KG/M2 | RESPIRATION RATE: 16 BRPM | OXYGEN SATURATION: 96 % | HEART RATE: 60 BPM | DIASTOLIC BLOOD PRESSURE: 85 MMHG | SYSTOLIC BLOOD PRESSURE: 146 MMHG

## 2023-07-25 DIAGNOSIS — C61 ADENOCARCINOMA OF PROSTATE (HCC): Primary | ICD-10-CM

## 2023-07-25 PROCEDURE — 99214 OFFICE O/P EST MOD 30 MIN: CPT | Performed by: RADIOLOGY

## 2023-07-25 PROCEDURE — 99212 OFFICE O/P EST SF 10 MIN: CPT | Performed by: RADIOLOGY

## 2024-06-24 ENCOUNTER — DOCUMENTATION (OUTPATIENT)
Dept: HEMATOLOGY/ONCOLOGY | Facility: HOSPITAL | Age: 74
End: 2024-06-24

## 2024-06-24 NOTE — PROGRESS NOTES
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 Patient has hx of prostate ca in 2016 s/p surg and rad onc. Patient had declining counts for several years and was referred to oncology. He had a bone marrow biopsy in 8/2023 and there was <5% blasts at that time and occasional ring sideroblasts. Counts worsened and repeat marrow done in 4/2024 and showed high risk MDS. He is on AZA and was on venetoclax which is on hold. Patient is referred for transplant. I have called patient and. MARIA ISABEL Shah    6/25/24 1300  Spoke with patient regarding transplant and info related to. Patient expresses some anxiety regarding this and is anxious for his family to hear about transplant as well. We have scheduled patient for 7/2/24 with Dr. Ibanez and I am sending text of appointment details. Patient has my phone number for contact information. MARIA ISABEL Shah

## 2024-06-25 DIAGNOSIS — D46.9 MDS (MYELODYSPLASTIC SYNDROME) (MULTI): ICD-10-CM

## 2024-07-02 ENCOUNTER — LAB (OUTPATIENT)
Dept: LAB | Facility: HOSPITAL | Age: 74
End: 2024-07-02
Payer: MEDICARE

## 2024-07-02 ENCOUNTER — OFFICE VISIT (OUTPATIENT)
Dept: HEMATOLOGY/ONCOLOGY | Facility: HOSPITAL | Age: 74
End: 2024-07-02
Payer: MEDICARE

## 2024-07-02 ENCOUNTER — LAB REQUISITION (OUTPATIENT)
Dept: LAB | Facility: HOSPITAL | Age: 74
End: 2024-07-02
Payer: MEDICARE

## 2024-07-02 VITALS
SYSTOLIC BLOOD PRESSURE: 146 MMHG | TEMPERATURE: 98.2 F | BODY MASS INDEX: 25.12 KG/M2 | RESPIRATION RATE: 16 BRPM | WEIGHT: 156.31 LBS | HEART RATE: 66 BPM | DIASTOLIC BLOOD PRESSURE: 78 MMHG | HEIGHT: 66 IN | OXYGEN SATURATION: 98 %

## 2024-07-02 DIAGNOSIS — D46.9 MDS (MYELODYSPLASTIC SYNDROME) (MULTI): ICD-10-CM

## 2024-07-02 DIAGNOSIS — Z76.82 STEM CELL TRANSPLANT CANDIDATE: ICD-10-CM

## 2024-07-02 DIAGNOSIS — D46.Z MDS (MYELODYSPLASTIC SYNDROME), HIGH GRADE (MULTI): ICD-10-CM

## 2024-07-02 PROBLEM — E78.5 HYPERLIPIDEMIA: Status: ACTIVE | Noted: 2021-11-24

## 2024-07-02 PROBLEM — I10 BENIGN ESSENTIAL HTN: Status: ACTIVE | Noted: 2020-11-25

## 2024-07-02 PROBLEM — K21.9 GASTROESOPHAGEAL REFLUX DISEASE WITHOUT ESOPHAGITIS: Status: ACTIVE | Noted: 2018-04-16

## 2024-07-02 PROBLEM — E03.9 ACQUIRED HYPOTHYROIDISM: Status: ACTIVE | Noted: 2017-04-10

## 2024-07-02 LAB
ABO GROUP (TYPE) IN BLOOD: NORMAL
ALBUMIN SERPL BCP-MCNC: 4.3 G/DL (ref 3.4–5)
ALP SERPL-CCNC: 48 U/L (ref 33–136)
ALT SERPL W P-5'-P-CCNC: 17 U/L (ref 10–52)
ANION GAP SERPL CALC-SCNC: 11 MMOL/L (ref 10–20)
ANTIBODY SCREEN: NORMAL
AST SERPL W P-5'-P-CCNC: 16 U/L (ref 9–39)
BASOPHILS # BLD AUTO: 0 X10*3/UL (ref 0–0.1)
BASOPHILS NFR BLD AUTO: 0 %
BILIRUB SERPL-MCNC: 0.4 MG/DL (ref 0–1.2)
BUN SERPL-MCNC: 15 MG/DL (ref 6–23)
CALCIUM SERPL-MCNC: 9.8 MG/DL (ref 8.6–10.3)
CHLORIDE SERPL-SCNC: 104 MMOL/L (ref 98–107)
CO2 SERPL-SCNC: 29 MMOL/L (ref 21–32)
CREAT SERPL-MCNC: 1.05 MG/DL (ref 0.5–1.3)
DACRYOCYTES BLD QL SMEAR: NORMAL
EGFRCR SERPLBLD CKD-EPI 2021: 74 ML/MIN/1.73M*2
EOSINOPHIL # BLD AUTO: 0.02 X10*3/UL (ref 0–0.4)
EOSINOPHIL NFR BLD AUTO: 0.8 %
ERYTHROCYTE [DISTWIDTH] IN BLOOD BY AUTOMATED COUNT: 27.1 % (ref 11.5–14.5)
GLUCOSE SERPL-MCNC: 97 MG/DL (ref 74–99)
HCT VFR BLD AUTO: 32.2 % (ref 41–52)
HGB BLD-MCNC: 10.4 G/DL (ref 13.5–17.5)
IMM GRANULOCYTES # BLD AUTO: 0.01 X10*3/UL (ref 0–0.5)
IMM GRANULOCYTES NFR BLD AUTO: 0.4 % (ref 0–0.9)
LAB AP ASR DISCLAIMER: NORMAL
LABORATORY COMMENT REPORT: NORMAL
LYMPHOCYTES # BLD AUTO: 0.66 X10*3/UL (ref 0.8–3)
LYMPHOCYTES NFR BLD AUTO: 26.5 %
MCH RBC QN AUTO: 33.5 PG (ref 26–34)
MCHC RBC AUTO-ENTMCNC: 32.3 G/DL (ref 32–36)
MCV RBC AUTO: 104 FL (ref 80–100)
MONOCYTES # BLD AUTO: 0.33 X10*3/UL (ref 0.05–0.8)
MONOCYTES NFR BLD AUTO: 13.3 %
NEUTROPHILS # BLD AUTO: 1.47 X10*3/UL (ref 1.6–5.5)
NEUTROPHILS NFR BLD AUTO: 59 %
NRBC BLD-RTO: 0 /100 WBCS (ref 0–0)
OVALOCYTES BLD QL SMEAR: NORMAL
PATH REPORT.COMMENTS IMP SPEC: NORMAL
PATH REPORT.FINAL DX SPEC: NORMAL
PATH REPORT.GROSS SPEC: NORMAL
PATH REPORT.MICROSCOPIC SPEC OTHER STN: NORMAL
PATH REPORT.TOTAL CANCER: NORMAL
PLATELET # BLD AUTO: 120 X10*3/UL (ref 150–450)
POLYCHROMASIA BLD QL SMEAR: NORMAL
POTASSIUM SERPL-SCNC: 4.1 MMOL/L (ref 3.5–5.3)
PROT SERPL-MCNC: 7.5 G/DL (ref 6.4–8.2)
RBC # BLD AUTO: 3.1 X10*6/UL (ref 4.5–5.9)
RBC MORPH BLD: NORMAL
RH FACTOR (ANTIGEN D): NORMAL
SCHISTOCYTES BLD QL SMEAR: NORMAL
SODIUM SERPL-SCNC: 140 MMOL/L (ref 136–145)
WBC # BLD AUTO: 2.5 X10*3/UL (ref 4.4–11.3)

## 2024-07-02 PROCEDURE — 80053 COMPREHEN METABOLIC PANEL: CPT

## 2024-07-02 PROCEDURE — 86901 BLOOD TYPING SEROLOGIC RH(D): CPT

## 2024-07-02 PROCEDURE — 3077F SYST BP >= 140 MM HG: CPT | Performed by: INTERNAL MEDICINE

## 2024-07-02 PROCEDURE — 1126F AMNT PAIN NOTED NONE PRSNT: CPT | Performed by: INTERNAL MEDICINE

## 2024-07-02 PROCEDURE — 3078F DIAST BP <80 MM HG: CPT | Performed by: INTERNAL MEDICINE

## 2024-07-02 PROCEDURE — 1036F TOBACCO NON-USER: CPT | Performed by: INTERNAL MEDICINE

## 2024-07-02 PROCEDURE — 86832 HLA CLASS I HIGH DEFIN QUAL: CPT

## 2024-07-02 PROCEDURE — 85025 COMPLETE CBC W/AUTO DIFF WBC: CPT

## 2024-07-02 PROCEDURE — 99001 SPECIMEN HANDLING PT-LAB: CPT | Mod: OUT | Performed by: INTERNAL MEDICINE

## 2024-07-02 PROCEDURE — 36415 COLL VENOUS BLD VENIPUNCTURE: CPT

## 2024-07-02 PROCEDURE — 81382 HLA II TYPING 1 LOC HR: CPT | Mod: 59

## 2024-07-02 PROCEDURE — 99215 OFFICE O/P EST HI 40 MIN: CPT | Performed by: INTERNAL MEDICINE

## 2024-07-02 PROCEDURE — 99205 OFFICE O/P NEW HI 60 MIN: CPT | Performed by: INTERNAL MEDICINE

## 2024-07-02 PROCEDURE — 81379 HLA I TYPING COMPLETE HR: CPT

## 2024-07-02 RX ORDER — FLUCONAZOLE 200 MG/1
2 TABLET ORAL DAILY
COMMUNITY
Start: 2024-05-09

## 2024-07-02 RX ORDER — ACYCLOVIR 400 MG/1
1 TABLET ORAL 2 TIMES DAILY
COMMUNITY
Start: 2024-05-09

## 2024-07-02 RX ORDER — LEVOTHYROXINE SODIUM 75 UG/1
75 TABLET ORAL DAILY
COMMUNITY

## 2024-07-02 RX ORDER — SIMVASTATIN 40 MG/1
40 TABLET, FILM COATED ORAL NIGHTLY
COMMUNITY

## 2024-07-02 RX ORDER — CIPROFLOXACIN 500 MG/1
500 TABLET ORAL
COMMUNITY
Start: 2024-05-09

## 2024-07-02 ASSESSMENT — PAIN SCALES - GENERAL: PAINLEVEL: 0-NO PAIN

## 2024-07-02 NOTE — LETTER
July 2, 2024       No Recipients    Patient: Jose Mahoney   YOB: 1950   Date of Visit: 7/2/2024       Dear Dr. Qurales Recipients:    Thank you for referring Jose Mahoney to me for evaluation. Below are my notes for this consultation.  If you have questions, please do not hesitate to call me. I look forward to following your patient along with you.       Sincerely,     Mindi Ibanez MD      CC:   No Recipients  ______________________________________________________________________________________        Patient ID: Jose Mahoney is a 73 y.o. male.  Referring Physician: Mindi Ibanez MD  [id number] [place] [address]  Primary Care Provider: No Assigned PCP Generic Provider, MD      Assessment/Plan          MDS (myelodysplastic syndrome), high grade (Multi)  [facility].  We discussed the role of allogeneic stem transplant in MDS as the only potentially curative approach.  The role of allogeneic stem cell transplant in the management of high risk MDS was reviewed with the patient and accompanying family members.  Transplant procedures, general risk, and general benefits were introduced.  The importance of adequate disease control, optimization of other comorbid illnesses, suitable donor availability, and adequate caregiver support were highlighted.  At this point, I have recommended that we move forward with a donor search.  The patient's HLA typing has been drawn, and we will plan to type his daughter, Jane, as the only potential related donor.  Will also look at unrelated donor and cord blood searches.  [facility]  I will plan to see him back in about 4 weeks to review donor search.    ____________________________________________________________________________________________________________________    HISTORY  Mr. Mahoney is a 73y [employer]  who is referred for consultation  regarding the role of allogeneic stem cell transplant in the management of MDS.  His hematologic history dates back at least several years as he recalls being told he had low WBC after treatment for his prostate cancer.  He had a BM evaluation in August 2023 which showed MDS with MLD (IPSS-R Intermediate).  He did not have NGS at that time, and was observed.  He developed progression cytopenias, prompting repeat BM examination in April 2024, showing now high risk MDS with mutated TP53 (high risk IPSS-R and Mol IPSS-R).  He was started on azacitidine with venetoclax in May, and is now referred for transplant consult.  He has tolerated treatment well so far.      He denies current fevers, chills, nausea, vomiting, diarrhea, rash, dypnea, and pain.      Jose Mahoney  reports that he has quit smoking. His smoking use included cigarettes. He has never used smokeless tobacco.  He  reports that he does not currently use alcohol.  He  reports no history of drug use.  Family History   Problem Relation Name Age of Onset   • Dementia Sister     • Stroke Sister     • Lung cancer Sister     • Bone cancer Brother     • No Known Problems Daughter Jane      Oncology History   MDS (myelodysplastic syndrome), high grade (Multi)   8/2/2023 Initial Diagnosis    DX:  Myelodysplastic syndrome w/ MLD and mutated TP53  Presented with thrombocytopenia and anemia    [facility]OW [facility]  Multi lineage dysplasia, no increased blasts  KARYOTYPE:  del5q, del7q  MYELOID NGS: Not performed    IPSS-R SCORE 3.5   IPSS-R RISK Int        4/10/2024 -  Disease Reevaluation    BONE MARROW ([facility] 4/10[facility]24)  Hypercellular with erythroid predominant trilineage hematopoiesis, multi lineage dysplasia, 3-5% blasts  FISH: del5q, del7q, TRACY 17p  MYELOID NGS: DNMT3A R882H (47.5%), TP53 (79.7%)    IPSS-R SCORE 6   IPSS-R RISK High   Mol IPSS-R SCORE 5.8   Mol IPSS-R RISK High        5/13/2024 -  Chemotherapy    Azacitdine + venetoclax  - C1D1 5/13/24,  "C2 delayed due to ongoing neutropenia, venetoclax held  - C2D1 7/1/24, joceline not restarted yet         Performance Status:  Symptomatic; fully ambulatory    Objective  BSA: 1.82 meters squared  /78   Pulse 66   Temp 36.8 °C (98.2 °F)   Resp 16   Ht (S) 1.683 m (5' 6.26\")   Wt 70.9 kg (156 lb 4.9 oz)   SpO2 98%   BMI 25.03 kg/m²   Physical Exam  Vitals reviewed.   Constitutional:       Appearance: Normal appearance.   Eyes:      Conjunctiva/sclera: Conjunctivae normal.      Pupils: Pupils are equal, round, and reactive to light.   Skin:     General: Skin is warm and dry.      Findings: No rash.   Psychiatric:         Mood and Affect: Mood normal.         Time Spent  Prep time on day of patient encounter: 17 minutes  Time spent directly with patient, family or caregiver: 35 minutes  Additional Time Spent on Patient Care Activities: 8 minutes  Documentation Time: 9 minutes  Other Time Spent: 0 minutes  Total: 69 minutes        Mindi Ibanez MD                           "

## 2024-07-02 NOTE — PROGRESS NOTES
Patient ID: Jose Mahoney is a 73 y.o. male.  Referring Physician: Mindi Ibanez MD  [id number] [place] [address]  Primary Care Provider: No Assigned PCP Generic Provider, MD      Assessment/Plan           MDS (myelodysplastic syndrome), high grade (Multi)  [facility] risk [facility].  We discussed the role of allogeneic stem transplant in MDS as the only potentially curative approach.  The role of allogeneic stem cell transplant in the management of high risk MDS was reviewed with the patient and accompanying family members.  Transplant procedures, general risk, and general benefits were introduced.  The importance of adequate disease control, optimization of other comorbid illnesses, suitable donor availability, and adequate caregiver support were highlighted.  At this point, I have recommended that we move forward with a donor search.  The patient's HLA typing has been drawn, and we will plan to type his daughter, Jane, as the only potential related donor.  Will also look at unrelated donor and cord blood searches.  [facility]  I will plan to see him back in about 4 weeks to review donor search.    ____________________________________________________________________________________________________________________    HISTORY  Mr. Mahoney is a 73y [employer]  who is referred for consultation regarding the role of allogeneic stem cell transplant in the management of MDS.  His hematologic history dates back at least several years as he recalls being told he had low WBC after treatment for his prostate cancer.  He had a BM evaluation in August 2023 which showed MDS with MLD (IPSS-R Intermediate).  He did not have NGS at that time, and was observed.  He developed progression cytopenias, prompting repeat BM examination in April 2024, showing now high risk MDS with mutated TP53 (high risk IPSS-R and Mol IPSS-R).   "He was started on azacitidine with venetoclax in May, and is now referred for transplant consult.  He has tolerated treatment well so far.      He denies current fevers, chills, nausea, vomiting, diarrhea, rash, dypnea, and pain.      Jose Mahoney  reports that he has quit smoking. His smoking use included cigarettes. He has never used smokeless tobacco.  He  reports that he does not currently use alcohol.  He  reports no history of drug use.  Family History   Problem Relation Name Age of Onset    Dementia Sister      Stroke Sister      Lung cancer Sister      Bone cancer Brother      No Known Problems Daughter Jane      Oncology History   MDS (myelodysplastic syndrome), high grade (Multi)   8/2/2023 Initial Diagnosis    DX:  Myelodysplastic syndrome w/ MLD and mutated TP53  Presented with thrombocytopenia and anemia    BONE MARROW ([facility])  Multi lineage dysplasia, no increased blasts  KARYOTYPE:  del5q, del7q  MYELOID NGS: Not performed    IPSS-R SCORE 3.5   IPSS-R RISK Int        4/10/2024 -  Disease Reevaluation    BONE MARROW ([facility] 4[facility]24)  Hypercellular with erythroid predominant trilineage hematopoiesis, multi lineage dysplasia, 3-5% blasts  FISH: del5q, del7q, TRACY 17p  MYELOID NGS: DNMT3A R882H (47.5%), TP53 (79.7%)    IPSS-R SCORE 6   IPSS-R RISK High   Mol IPSS-R SCORE 5.8   Mol IPSS-R RISK High        5/13/2024 -  Chemotherapy    Azacitdine + venetoclax  - C1D1 5/13/24, C2 delayed due to ongoing neutropenia, venetoclax held  - C2D1 7/1/24, joceline not restarted yet         Performance Status:  Symptomatic; fully ambulatory    Objective   BSA: 1.82 meters squared  /78   Pulse 66   Temp 36.8 °C (98.2 °F)   Resp 16   Ht (S) 1.683 m (5' 6.26\")   Wt 70.9 kg (156 lb 4.9 oz)   SpO2 98%   BMI 25.03 kg/m²   Physical Exam  Vitals reviewed.   Constitutional:       Appearance: Normal appearance.   Eyes:      Conjunctiva/sclera: Conjunctivae normal.      Pupils: Pupils are equal, round, and " reactive to light.   Skin:     General: Skin is warm and dry.      Findings: No rash.   Psychiatric:         Mood and Affect: Mood normal.         Time Spent  Prep time on day of patient encounter: 17 minutes  Time spent directly with patient, family or caregiver: 35 minutes  Additional Time Spent on Patient Care Activities: 8 minutes  Documentation Time: 9 minutes  Other Time Spent: 0 minutes  Total: 69 minutes        Mindi Ibanez MD

## 2024-07-02 NOTE — LETTER
July 2, 2024     Patient: Jose Mahoney   YOB: 1950   Date of Visit: 7/2/2024       To Whom It May Concern:    Jose Mahoney was seen in my clinic on 7/2/2024 at 1:00 pm. Please excuse his daugher, Jane Shrama, and her absence from work on this day to make the appointment.    If you have any questions or concerns, please don't hesitate to call.         Sincerely,          Mindi Ibanez MD        CC: No Recipients

## 2024-07-03 PROCEDURE — 86833 HLA CLASS II HIGH DEFIN QUAL: CPT | Mod: OUT | Performed by: INTERNAL MEDICINE

## 2024-07-03 NOTE — ASSESSMENT & PLAN NOTE
[facility] for high risk [facility].  We discussed the role of allogeneic stem transplant in MDS as the only potentially curative approach.  The role of allogeneic stem cell transplant in the management of high risk MDS was reviewed with the patient and accompanying family members.  Transplant procedures, general risk, and general benefits were introduced.  The importance of adequate disease control, optimization of other comorbid illnesses, suitable donor availability, and adequate caregiver support were highlighted.  At this point, I have recommended that we move forward with a donor search.  The patient's HLA typing has been drawn, and we will plan to type his daughter, Jane, as the only potential related donor.  Will also look at unrelated donor and cord blood searches.  [facility].  I will plan to see him back in about 4 weeks to review donor search.

## 2024-07-12 ENCOUNTER — LAB REQUISITION (OUTPATIENT)
Dept: LAB | Facility: CLINIC | Age: 74
End: 2024-07-12
Payer: MEDICARE

## 2024-07-12 DIAGNOSIS — D46.9 MYELODYSPLASTIC SYNDROME, UNSPECIFIED (MULTI): ICD-10-CM

## 2024-07-12 LAB
HLA CLS I TYP PNL BLD/T DONR HIGH RES: NORMAL
HLA RESULTS: NORMAL
HLA-A+B+C AB NFR SER: NORMAL %
HLA-DP+DQ+DR AB NFR SER: NORMAL %
HLA-DP+DQ+DR AB NFR SER: NORMAL %
HLA-DP2 QL: NORMAL
HLA-DQB1 HIGH RES: NORMAL
HLA-DRB1 HIGH RES: NORMAL

## 2024-07-22 ENCOUNTER — LAB REQUISITION (OUTPATIENT)
Dept: LAB | Facility: CLINIC | Age: 74
End: 2024-07-22
Payer: MEDICARE

## 2024-07-22 DIAGNOSIS — D46.9 MYELODYSPLASTIC SYNDROME, UNSPECIFIED (MULTI): ICD-10-CM

## 2024-07-22 LAB
HLA CLS I TYP PNL BLD/T DONR HIGH RES: NORMAL
HLA RESULTS: NORMAL
HLA-DP2 QL: NORMAL
HLA-DQB1 HIGH RES: NORMAL
HLA-DRB1 HIGH RES: NORMAL

## 2024-07-25 ENCOUNTER — LAB REQUISITION (OUTPATIENT)
Dept: LAB | Facility: CLINIC | Age: 74
End: 2024-07-25
Payer: MEDICARE

## 2024-07-25 DIAGNOSIS — D46.9 MYELODYSPLASTIC SYNDROME, UNSPECIFIED (MULTI): ICD-10-CM

## 2024-07-25 LAB
SPECIMEN HANDLING: NORMAL
SPECIMEN HANDLING: NORMAL

## 2024-07-30 ENCOUNTER — OFFICE VISIT (OUTPATIENT)
Dept: HEMATOLOGY/ONCOLOGY | Facility: HOSPITAL | Age: 74
End: 2024-07-30
Payer: OTHER GOVERNMENT

## 2024-07-30 VITALS
WEIGHT: 153.66 LBS | RESPIRATION RATE: 16 BRPM | BODY MASS INDEX: 24.61 KG/M2 | TEMPERATURE: 97.2 F | SYSTOLIC BLOOD PRESSURE: 146 MMHG | OXYGEN SATURATION: 100 % | DIASTOLIC BLOOD PRESSURE: 65 MMHG | HEART RATE: 83 BPM

## 2024-07-30 DIAGNOSIS — D46.9 MDS (MYELODYSPLASTIC SYNDROME) (MULTI): ICD-10-CM

## 2024-07-30 PROCEDURE — 1036F TOBACCO NON-USER: CPT | Performed by: INTERNAL MEDICINE

## 2024-07-30 PROCEDURE — 3078F DIAST BP <80 MM HG: CPT | Performed by: INTERNAL MEDICINE

## 2024-07-30 PROCEDURE — 99215 OFFICE O/P EST HI 40 MIN: CPT | Mod: GC | Performed by: INTERNAL MEDICINE

## 2024-07-30 PROCEDURE — 1159F MED LIST DOCD IN RCRD: CPT | Performed by: INTERNAL MEDICINE

## 2024-07-30 PROCEDURE — 99215 OFFICE O/P EST HI 40 MIN: CPT | Performed by: INTERNAL MEDICINE

## 2024-07-30 PROCEDURE — 1126F AMNT PAIN NOTED NONE PRSNT: CPT | Performed by: INTERNAL MEDICINE

## 2024-07-30 PROCEDURE — 3077F SYST BP >= 140 MM HG: CPT | Performed by: INTERNAL MEDICINE

## 2024-07-30 ASSESSMENT — PAIN SCALES - GENERAL: PAINLEVEL: 0-NO PAIN

## 2024-07-30 NOTE — Clinical Note
July 30, 2024       No Recipients    Patient: Jose Mahoney   YOB: 1950   Date of Visit: 7/30/2024       Dear Dr. Quarles Recipients:    Thank you for referring Jose Mahoney to me for evaluation. Below are my notes for this consultation.  If you have questions, please do not hesitate to call me. I look forward to following your patient along with you.       Sincerely,     Mindi Ibanez MD      CC:   No Recipients  ______________________________________________________________________________________        Patient ID: Jose Mahoney is a 73 y.o. male.  Referring Physician: Mindi Ibanez MD  [id number] [place] [address]  Primary Care Provider: No Assigned PCP Generic Provider, MD      Assessment/Plan     Assessment & Plan  MDS (myelodysplastic syndrome) (Multi)  [facility] for high risk MDS w/ TP53 (Dr. Keys).  Continued to discuss transplant, pt has good support with spouse and daughter who is medical tech. Has daughter haplo match and MUD in registry, will pursue full workup of MUD. Will need [facility] prior to proceeding with alloSCT - per Dr. Keys plan is to perform after this cycle (C3D1 = 8/5/24).      Pt seen and discussed with Dr. Ibanez.    Janak Chand DO  Hematology- Oncology Fellow, PGY-7    ____________________________________________________________________________________________________________________    HISTORY  Mr. Mahoney is a 73y [employer]  who is referred for consultation regarding the role of allogeneic stem cell transplant in the management of MDS.  His hematologic history dates back at least several years as he recalls being told he had low WBC after treatment for his prostate cancer.  He had a BM evaluation in August 2023 which showed MDS with MLD (IPSS-R Intermediate).  He did not have NGS at that time, and was observed.  He developed progression cytopenias,  prompting repeat BM examination in April 2024, showing now high risk MDS with mutated TP53 (high risk IPSS-R and Mol IPSS-R).  He was started on azacitidine with venetoclax in May, and is now referred for transplant consult.  Course complicate by neutropenia, venetoclax held and has not yet restarted, now s/p C2 with Aza only, due for next cycle next week 8/5.    He denies current fevers, chills, nausea, vomiting, diarrhea, rash, dypnea, and pain.      Jose Mahoney  reports that he has quit smoking. His smoking use included cigarettes. He has never used smokeless tobacco.  He  reports that he does not currently use alcohol.  He  reports no history of drug use.  Family History   Problem Relation Name Age of Onset    Dementia Sister      Stroke Sister      Lung cancer Sister      Bone cancer Brother      No Known Problems Daughter Jane      Oncology History   MDS (myelodysplastic syndrome), high grade (Multi)   8/2/2023 Initial Diagnosis    DX:  Myelodysplastic syndrome w/ MLD and mutated TP53  Presented with thrombocytopenia and anemia    B[facility] MARROW ([facility])  Multi lineage dysplasia, no increased blasts  KARYOTYPE:  del5q, del7q  MYELOID NGS: Not performed    IPSS-R SCORE 3.5   IPSS-R RISK Int        4/10/2024 -  Disease Reevaluation    BONE MARROW ([facility])  Hypercellular with erythroid predominant trilineage hematopoiesis, multi lineage dysplasia, 3-5% blasts  FISH: del5q, del7q, TRACY 17p  MYELOID NGS: DNMT3A R882H (47.5%), TP53 (79.7%)    IPSS-R SCORE 6   IPSS-R RISK High   Mol IPSS-R SCORE 5.8   Mol IPSS-R RISK High        5/13/2024 -  Chemotherapy    Azacitdine + venetoclax  - C1D1 5/13/24, C2 delayed due to ongoing neutropenia, venetoclax held  - C2D1 7/1/24, joceline not restarted yet         Performance Status:  Symptomatic; fully ambulatory    Objective  BSA: 1.81 meters squared  /65   Pulse 83   Temp 36.2 °C (97.2 °F)   Resp 16   Wt 69.7 kg (153 lb 10.6 oz)   SpO2 100%   BMI 24.61  kg/m²   Physical Exam  Vitals reviewed.   Constitutional:       Appearance: Normal appearance.   Eyes:      Conjunctiva/sclera: Conjunctivae normal.      Pupils: Pupils are equal, round, and reactive to light.   Skin:     General: Skin is warm and dry.      Findings: No rash.   Psychiatric:         Mood and Affect: Mood normal.                Pt seen and discussed with Dr. Ibanez.    Janak Chand DO  Hematology- Oncology Fellow, PGY-7                           Attestation signed by Mindi Ibanez MD at 7/30/2024  5:10 PM:  ATTENDING PHYSICIAN ADDENDUM    Tolerating HMA+venetoclax well.  Has URD, haplo, and cord options for potential SCT.  Would like to see how his disease has responded in order to plan for timing of allogeneic stem cell transplant.  Will await BM results.    I have independently seen and examined the patient with the oncology fellow, Janak Chand, on 07/30/24.  I agree with the note with the additions/exceptions as noted.    Time Spent  Prep time on day of patient encounter: 9 minutes  Time spent directly with patient, family or caregiver: 22 minutes  Additional Time Spent on Patient Care Activities: 7 minutes  Documentation Time: 5 minutes  Other Time Spent: 0 minutes  Total: 43 minutes         Mindi Ibanez MD

## 2024-07-30 NOTE — PROGRESS NOTES
Patient ID: Jose Mahoney is a 73 y.o. male.  Referring Physician: Mindi Ibanez MD  [id number] [place] [address]  Primary Care Provider: No Assigned PCP Generic Provider, MD      Assessment/Plan      Assessment & Plan  MDS (myelodysplastic syndrome) (Multi)  [facility] for high risk MDS w/ TP53 (Dr. Keys).  Continued to discuss transplant, pt has good support with spouse and daughter who is medical tech. Has daughter haplo match and MUD in registry, will pursue full workup of MUD. Will need [facility] prior to proceeding with alloSCT - per Dr. Keys plan is to perform after this cycle (C3D1 = 8/5/24).      Pt seen and discussed with Dr. Ibanez.    Janak Chand DO  Hematology- Oncology Fellow, PGY-7    ____________________________________________________________________________________________________________________    HISTORY  Mr. Mahoney is a 73y [employer]  who is referred for consultation regarding the role of allogeneic stem cell transplant in the management of MDS.  His hematologic history dates back at least several years as he recalls being told he had low WBC after treatment for his prostate cancer.  He had a BM evaluation in August 2023 which showed MDS with MLD (IPSS-R Intermediate).  He did not have NGS at that time, and was observed.  He developed progression cytopenias, prompting repeat BM examination in April 2024, showing now high risk MDS with mutated TP53 (high risk IPSS-R and Mol IPSS-R).  He was started on azacitidine with venetoclax in May, and is now referred for transplant consult.  Course complicate by neutropenia, venetoclax held and has not yet restarted, now s/p C2 with Aza only, due for next cycle next week 8/5.    He denies current fevers, chills, nausea, vomiting, diarrhea, rash, dypnea, and pain.      Jose Denzel  reports that he has quit smoking. His smoking use included  cigarettes. He has never used smokeless tobacco.  He  reports that he does not currently use alcohol.  He  reports no history of drug use.  Family History   Problem Relation Name Age of Onset    Dementia Sister      Stroke Sister      Lung cancer Sister      Bone cancer Brother      No Known Problems Daughter Jane      Oncology History   MDS (myelodysplastic syndrome), high grade (Multi)   8/2/2023 Initial Diagnosis    DX:  Myelodysplastic syndrome w/ MLD and mutated TP53  Presented with thrombocytopenia and anemia    BONE MARROW ([facility])  Multi lineage dysplasia, no increased blasts  KARYOTYPE:  del5q, del7q  MYELOID NGS: Not performed    IPSS-R SCORE 3.5   IPSS-R RISK Int        4/10/2024 -  Disease Reevaluation    BONE MARROW ([facility])  Hypercellular with erythroid predominant trilineage hematopoiesis, multi lineage dysplasia, 3-5% blasts  FISH: del5q, del7q, TRACY 17p  MYELOID NGS: DNMT3A R882H (47.5%), TP53 (79.7%)    IPSS-R SCORE 6   IPSS-R RISK High   Mol IPSS-R SCORE 5.8   Mol IPSS-R RISK High        5/13/2024 -  Chemotherapy    Azacitdine + venetoclax  - C1D1 5/13/24, C2 delayed due to ongoing neutropenia, venetoclax held  - C2D1 7/1/24, joceline not restarted yet         Performance Status:  Symptomatic; fully ambulatory    Objective   BSA: 1.81 meters squared  /65   Pulse 83   Temp 36.2 °C (97.2 °F)   Resp 16   Wt 69.7 kg (153 lb 10.6 oz)   SpO2 100%   BMI 24.61 kg/m²   Physical Exam  Vitals reviewed.   Constitutional:       Appearance: Normal appearance.   Eyes:      Conjunctiva/sclera: Conjunctivae normal.      Pupils: Pupils are equal, round, and reactive to light.   Skin:     General: Skin is warm and dry.      Findings: No rash.   Psychiatric:         Mood and Affect: Mood normal.                Pt seen and discussed with Dr. Ibanez.    Janak Chand DO  Hematology- Oncology Fellow, PGY-7

## 2024-08-02 ENCOUNTER — HOSPITAL ENCOUNTER (OUTPATIENT)
Dept: LAB | Age: 74
Discharge: HOME | End: 2024-08-02
Payer: MEDICARE

## 2024-08-02 DIAGNOSIS — C61 ADENOCARCINOMA OF PROSTATE (HCC): ICD-10-CM

## 2024-08-02 LAB — PSA SERPL-MCNC: <0.01 NG/ML (ref 0–4)

## 2024-08-02 PROCEDURE — 84153 ASSAY OF PSA TOTAL: CPT

## 2024-08-02 PROCEDURE — 36415 COLL VENOUS BLD VENIPUNCTURE: CPT

## 2024-08-06 ENCOUNTER — HOSPITAL ENCOUNTER (OUTPATIENT)
Dept: RADIATION ONCOLOGY | Age: 74
Discharge: HOME | End: 2024-08-06
Payer: MEDICARE

## 2024-08-06 VITALS
OXYGEN SATURATION: 98 % | TEMPERATURE: 98.6 F | DIASTOLIC BLOOD PRESSURE: 79 MMHG | SYSTOLIC BLOOD PRESSURE: 121 MMHG | WEIGHT: 156.8 LBS | RESPIRATION RATE: 18 BRPM | HEART RATE: 60 BPM | BODY MASS INDEX: 23.84 KG/M2

## 2024-08-06 DIAGNOSIS — C61 ADENOCARCINOMA OF PROSTATE (HCC): Primary | ICD-10-CM

## 2024-08-06 PROCEDURE — 99214 OFFICE O/P EST MOD 30 MIN: CPT | Performed by: RADIOLOGY

## 2024-08-06 PROCEDURE — 99212 OFFICE O/P EST SF 10 MIN: CPT | Performed by: RADIOLOGY

## 2024-08-06 RX ORDER — FLUCONAZOLE 200 MG/1
400 TABLET ORAL DAILY
COMMUNITY
Start: 2024-05-09

## 2024-08-06 RX ORDER — ACYCLOVIR 400 MG/1
400 TABLET ORAL 2 TIMES DAILY
COMMUNITY
Start: 2024-05-09

## 2024-08-06 RX ORDER — AZACITIDINE 100 MG/1
INJECTION, POWDER, LYOPHILIZED, FOR SOLUTION INTRAVENOUS; SUBCUTANEOUS SEE ADMIN INSTRUCTIONS
COMMUNITY

## 2024-08-06 NOTE — PROGRESS NOTES
NURSING ASSESSMENT     Date: 8/6/2024        Patient Name: Nicko Pierre     YOB: 1950      Age:  73 y.o.      MRN: [id number]       Chaperone [] Yes   [x] No      Advance Directives:   Do you currently have completed advance directives (living will)? [x] Yes   [] No         *If yes, please bring us a copy for your records.  *If no, would you like info or assistance in completing advance directives (living will)?   [] Yes   [x] No    Pain Score:   Pain Score (1-10): none      Is pain affecting your ability to take care of yourself or move throughout your home?                        [] Yes   [x] No    General: No Problems  Patient has gained weight [] Yes   [x] No  Patient has lost weight [] Yes   [x] No  How much weight in pounds and over what length of time:     Eyes (Ophthalmic):  wears bifocals     Skin (Dermatological): has notice darker complexion with the Vidaza treatments and a couple dark spots on nose      ENT: No Problems     Respiratory: No Problems     Cardiovascular: No Problems      Device   [] Yes   [x] No   Copy of Card Obtained [] Yes   [x] No    Gastrointestinal: Constipation last month after treatment and now takes miralax as needed with good results, \"food tastes funny every now and then\"    Genito-Urinary: IPSS 9, frequency and nocturia, rare intermittency     Breast: No Problems     Musculoskeletal: No Problems    Neurological: No Problems      Hematological and Lymphatic: No Problems     Endocrine: Thyroid Problems takes levothyroxine    A 10-point review of systems  has been conducted and pertinent positives have been   recorded. All other review of systems are negative    Was the patient admitted during the course of treatment OR within 30 days of treatment? no    Additional Comments: pt had cycle 3 Vidaza yesterday, pt will have a stem cell transplant in about 6-8 weeks

## 2024-08-12 ENCOUNTER — APPOINTMENT (OUTPATIENT)
Dept: RADIOLOGY | Facility: HOSPITAL | Age: 74
End: 2024-08-12
Payer: MEDICARE

## 2024-08-12 ENCOUNTER — HOSPITAL ENCOUNTER (EMERGENCY)
Facility: HOSPITAL | Age: 74
Discharge: HOME | End: 2024-08-13
Attending: EMERGENCY MEDICINE
Payer: MEDICARE

## 2024-08-12 DIAGNOSIS — K52.9 COLITIS: ICD-10-CM

## 2024-08-12 DIAGNOSIS — R19.7 DIARRHEA, UNSPECIFIED TYPE: Primary | ICD-10-CM

## 2024-08-12 LAB
ALBUMIN SERPL BCP-MCNC: 4.2 G/DL (ref 3.4–5)
ALP SERPL-CCNC: 53 U/L (ref 33–136)
ALT SERPL W P-5'-P-CCNC: 17 U/L (ref 10–52)
ANION GAP SERPL CALC-SCNC: 12 MMOL/L (ref 10–20)
AST SERPL W P-5'-P-CCNC: 15 U/L (ref 9–39)
BASOPHILS # BLD AUTO: 0.01 X10*3/UL (ref 0–0.1)
BASOPHILS NFR BLD AUTO: 0.2 %
BILIRUB DIRECT SERPL-MCNC: 0 MG/DL (ref 0–0.3)
BILIRUB SERPL-MCNC: 0.4 MG/DL (ref 0–1.2)
BUN SERPL-MCNC: 15 MG/DL (ref 6–23)
CALCIUM SERPL-MCNC: 10.6 MG/DL (ref 8.6–10.3)
CHLORIDE SERPL-SCNC: 102 MMOL/L (ref 98–107)
CO2 SERPL-SCNC: 28 MMOL/L (ref 21–32)
CREAT SERPL-MCNC: 1.12 MG/DL (ref 0.5–1.3)
EGFRCR SERPLBLD CKD-EPI 2021: 69 ML/MIN/1.73M*2
EOSINOPHIL # BLD AUTO: 0.09 X10*3/UL (ref 0–0.4)
EOSINOPHIL NFR BLD AUTO: 2 %
ERYTHROCYTE [DISTWIDTH] IN BLOOD BY AUTOMATED COUNT: 17.2 % (ref 11.5–14.5)
GLUCOSE SERPL-MCNC: 101 MG/DL (ref 74–99)
HCT VFR BLD AUTO: 39.1 % (ref 41–52)
HGB BLD-MCNC: 12.8 G/DL (ref 13.5–17.5)
IMM GRANULOCYTES # BLD AUTO: 0.01 X10*3/UL (ref 0–0.5)
IMM GRANULOCYTES NFR BLD AUTO: 0.2 % (ref 0–0.9)
LACTATE SERPL-SCNC: 1.2 MMOL/L (ref 0.4–2)
LIPASE SERPL-CCNC: 23 U/L (ref 9–82)
LYMPHOCYTES # BLD AUTO: 0.91 X10*3/UL (ref 0.8–3)
LYMPHOCYTES NFR BLD AUTO: 20.6 %
MAGNESIUM SERPL-MCNC: 1.91 MG/DL (ref 1.6–2.4)
MCH RBC QN AUTO: 33.8 PG (ref 26–34)
MCHC RBC AUTO-ENTMCNC: 32.7 G/DL (ref 32–36)
MCV RBC AUTO: 103 FL (ref 80–100)
MONOCYTES # BLD AUTO: 0.41 X10*3/UL (ref 0.05–0.8)
MONOCYTES NFR BLD AUTO: 9.3 %
NEUTROPHILS # BLD AUTO: 2.99 X10*3/UL (ref 1.6–5.5)
NEUTROPHILS NFR BLD AUTO: 67.7 %
NRBC BLD-RTO: 0 /100 WBCS (ref 0–0)
PLATELET # BLD AUTO: 249 X10*3/UL (ref 150–450)
POTASSIUM SERPL-SCNC: 4.3 MMOL/L (ref 3.5–5.3)
PROT SERPL-MCNC: 7.7 G/DL (ref 6.4–8.2)
RBC # BLD AUTO: 3.79 X10*6/UL (ref 4.5–5.9)
SODIUM SERPL-SCNC: 138 MMOL/L (ref 136–145)
WBC # BLD AUTO: 4.4 X10*3/UL (ref 4.4–11.3)

## 2024-08-12 PROCEDURE — 80053 COMPREHEN METABOLIC PANEL: CPT | Performed by: EMERGENCY MEDICINE

## 2024-08-12 PROCEDURE — 2500000004 HC RX 250 GENERAL PHARMACY W/ HCPCS (ALT 636 FOR OP/ED): Performed by: EMERGENCY MEDICINE

## 2024-08-12 PROCEDURE — 85025 COMPLETE CBC W/AUTO DIFF WBC: CPT | Performed by: EMERGENCY MEDICINE

## 2024-08-12 PROCEDURE — 74176 CT ABD & PELVIS W/O CONTRAST: CPT | Mod: FOREIGN READ | Performed by: RADIOLOGY

## 2024-08-12 PROCEDURE — 36415 COLL VENOUS BLD VENIPUNCTURE: CPT | Performed by: EMERGENCY MEDICINE

## 2024-08-12 PROCEDURE — 99284 EMERGENCY DEPT VISIT MOD MDM: CPT | Mod: 25

## 2024-08-12 PROCEDURE — 83735 ASSAY OF MAGNESIUM: CPT | Performed by: EMERGENCY MEDICINE

## 2024-08-12 PROCEDURE — 74176 CT ABD & PELVIS W/O CONTRAST: CPT

## 2024-08-12 PROCEDURE — 96365 THER/PROPH/DIAG IV INF INIT: CPT

## 2024-08-12 PROCEDURE — 2500000004 HC RX 250 GENERAL PHARMACY W/ HCPCS (ALT 636 FOR OP/ED): Mod: JZ | Performed by: EMERGENCY MEDICINE

## 2024-08-12 PROCEDURE — 83690 ASSAY OF LIPASE: CPT | Performed by: EMERGENCY MEDICINE

## 2024-08-12 PROCEDURE — 83605 ASSAY OF LACTIC ACID: CPT | Performed by: EMERGENCY MEDICINE

## 2024-08-12 PROCEDURE — 96361 HYDRATE IV INFUSION ADD-ON: CPT

## 2024-08-12 RX ORDER — ONDANSETRON 4 MG/1
4 TABLET, ORALLY DISINTEGRATING ORAL EVERY 8 HOURS PRN
Qty: 20 TABLET | Refills: 0 | Status: SHIPPED | OUTPATIENT
Start: 2024-08-12 | End: 2024-08-19

## 2024-08-12 RX ORDER — METRONIDAZOLE 500 MG/1
500 TABLET ORAL 3 TIMES DAILY
Qty: 30 TABLET | Refills: 0 | Status: SHIPPED | OUTPATIENT
Start: 2024-08-12 | End: 2024-08-22

## 2024-08-12 RX ORDER — METRONIDAZOLE 500 MG/100ML
500 INJECTION, SOLUTION INTRAVENOUS ONCE
Status: COMPLETED | OUTPATIENT
Start: 2024-08-12 | End: 2024-08-13

## 2024-08-12 ASSESSMENT — COLUMBIA-SUICIDE SEVERITY RATING SCALE - C-SSRS
1. IN THE PAST MONTH, HAVE YOU WISHED YOU WERE DEAD OR WISHED YOU COULD GO TO SLEEP AND NOT WAKE UP?: NO
6. HAVE YOU EVER DONE ANYTHING, STARTED TO DO ANYTHING, OR PREPARED TO DO ANYTHING TO END YOUR LIFE?: NO
2. HAVE YOU ACTUALLY HAD ANY THOUGHTS OF KILLING YOURSELF?: NO

## 2024-08-12 ASSESSMENT — LIFESTYLE VARIABLES
TOTAL SCORE: 0
HAVE YOU EVER FELT YOU SHOULD CUT DOWN ON YOUR DRINKING: NO
EVER FELT BAD OR GUILTY ABOUT YOUR DRINKING: NO
HAVE PEOPLE ANNOYED YOU BY CRITICIZING YOUR DRINKING: NO
EVER HAD A DRINK FIRST THING IN THE MORNING TO STEADY YOUR NERVES TO GET RID OF A HANGOVER: NO

## 2024-08-12 ASSESSMENT — PAIN SCALES - GENERAL: PAINLEVEL_OUTOF10: 0 - NO PAIN

## 2024-08-12 ASSESSMENT — PAIN - FUNCTIONAL ASSESSMENT: PAIN_FUNCTIONAL_ASSESSMENT: 0-10

## 2024-08-13 VITALS
HEART RATE: 44 BPM | BODY MASS INDEX: 23.19 KG/M2 | OXYGEN SATURATION: 100 % | SYSTOLIC BLOOD PRESSURE: 136 MMHG | RESPIRATION RATE: 16 BRPM | WEIGHT: 153 LBS | HEIGHT: 68 IN | DIASTOLIC BLOOD PRESSURE: 75 MMHG | TEMPERATURE: 99.3 F

## 2024-08-13 NOTE — ED PROVIDER NOTES
CT scan revealed mild colitis on the left side dose of IV Flagyl was given blood work was reviewed and shared decision making patient opted to be discharged home  Labs Reviewed   BASIC METABOLIC PANEL - Abnormal       Result Value    Glucose 101 (*)     Sodium 138      Potassium 4.3      Chloride 102      Bicarbonate 28      Anion Gap 12      Urea Nitrogen 15      Creatinine 1.12      eGFR 69      Calcium 10.6 (*)    CBC WITH AUTO DIFFERENTIAL - Abnormal    WBC 4.4      nRBC 0.0      RBC 3.79 (*)     Hemoglobin 12.8 (*)     Hematocrit 39.1 (*)      (*)     MCH 33.8      MCHC 32.7      RDW 17.2 (*)     Platelets 249      Neutrophils % 67.7      Immature Granulocytes %, Automated 0.2      Lymphocytes % 20.6      Monocytes % 9.3      Eosinophils % 2.0      Basophils % 0.2      Neutrophils Absolute 2.99      Immature Granulocytes Absolute, Automated 0.01      Lymphocytes Absolute 0.91      Monocytes Absolute 0.41      Eosinophils Absolute 0.09      Basophils Absolute 0.01     LIPASE - Normal    Lipase 23      Narrative:     Venipuncture immediately after or during the administration of Metamizole may lead to falsely low results. Testing should be performed immediately prior to Metamizole dosing.   LACTATE - Normal    Lactate 1.2      Narrative:     Venipuncture immediately after or during the administration of Metamizole may lead to falsely low results. Testing should be performed immediately  prior to Metamizole dosing.   HEPATIC FUNCTION PANEL - Normal    Albumin 4.2      Bilirubin, Total 0.4      Bilirubin, Direct 0.0      Alkaline Phosphatase 53      ALT 17      AST 15      Total Protein 7.7     MAGNESIUM - Normal    Magnesium 1.91     STOOL PATHOGEN PANEL, PCR        CT abdomen pelvis wo IV contrast   Final Result   Mild sigmoid and left colon wall thickening compatible with mild   colitis.   Signed by MD Torrey Ramirez MD  08/13/24 0016

## 2024-08-13 NOTE — ED PROVIDER NOTES
HPI   Chief Complaint   Patient presents with   • Diarrhea     Vomiting and diarrhea since Saturday. Emesis stopped yesterday, diarrhea continues. Pt actively receiving chemo, last tx Friday. Hx of MDS       HPI: 73-year-old male history of myelodysplastic syndrome who [facility] presents with vomiting and diarrhea since Saturday.  He states the vomiting stopped yesterday but the diarrhea continues.  He states that he is [facility] he is not sure the name of the medication.  He denies any pain currently.  He states that sometimes he gets cramping before the diarrhea.  No recent travel.  He is not currently on any antibiotics.  He denies any history of cholecystitis colitis appendicitis or diverticulitis.  He is not on any blood thinners.  He denies any blood in his urine blood in his stools or dark tarry stools.    Family HX: Denies any significant/pertinent family history.  Social Hx: Denies ETOH or drug use.  Review of systems:  Gen.: No weight loss, fatigue, anorexia, insomnia, fever.   Eyes: No vision loss, double vision, drainage, eye pain.   ENT: No pharyngitis, dry mouth.   Cardiac: No chest pain, palpitations, syncope, near syncope.   Pulmonary: No shortness of breath, cough, hemoptysis.   Heme/lymph: No swollen glands, fever, bleeding.   GI: No abdominal pain, change in bowel habits, melena, hematemesis, hematochezia, nausea, vomiting  : No discharge, dysuria, frequency, urgency, hematuria.   Musculoskeletal: No limb pain, joint pain, joint swelling.   Skin: No rashes.   Psych: No depression, anxiety, suicidality, homicidality.   Review of systems is otherwise negative unless stated above or in history of present illness.    Physical Exam:    Appearance: Alert, oriented , cooperative,  in no acute distress. Well nourished & well hydrated.    Skin: Intact,  dry skin, no lesions, rash, petechiae or purpura.     Eyes: PERRLA, EOMs intact,  Conjunctiva  pink with no redness or exudates. Eyelids without lesions. No scleral icterus.     ENT: Hearing grossly intact. External auditory canals patent, tympanic membranes intact with visible landmarks. Nares patent, mucus membranes moist. Dentition without lesions. Pharynx clear, uvula midline.     Neck: Supple, without meningismus. Thyroid not palpable. Trachea at midline. No lymphadenopathy.    Pulmonary: Clear bilaterally with good chest wall excursion. No rales, rhonchi or wheezing. No accessory muscle use or stridor.    Cardiac: Normal S1, S2 without murmur, rub, gallop or extrasystole. No JVD, Carotids without bruits.    Abdomen: Soft, nontender, active bowel sounds.  No palpable organomegaly.  No rebound or guarding.  No CVA tenderness.    Genitourinary: Exam deferred.    Musculoskeletal: Full range of motion. no pain, edema, or deformity. Pulses full and equal. No cyanosis, clubbing, or edema.    Neurological:  Cranial nerves II through XII are grossly intact, finger-nose touch is normal, normal sensation, no weakness, no focal findings identified.    Psychiatric: Appropriate mood and affect.     Medical Decision-Making:  Testing: Patient will be given IV fluids.  We have ordered labs to check his white blood cell count and his hemoglobin given that he has a history of myelodysplastic syndrome.  Will also obtain a CT scan.  If he does have any diarrhea we will obtain stool studies  Treatment:   Reevaluation:   Plan: handoff Patient and family/friend/caregiver are in agreement with this plan.   Impression:   1.  Diarrhea  2.                  Patient History   Past Medical History:   Diagnosis Date   • Acquired hypothyroidism 04/10/2017    Formatting of this note might be different from the original.   Stable on Levothyroxine   • Adenocarcinoma of prostate (Multi) 11/23/2010    Formatting of this note might be different from the original.   [place] score = 7 (9/2010)  Formatting of this note might be different from  the original.   Follows with Radiation Oncologist  Formatting of this note might be different from the original.   Formatting of this note might be different from the original.   [place] score = 7 (9/2010)  May 18, 2016 Entered By: WOLF CARDOSO Comment:   • Benign essential HTN 11/25/2020   • Gastroesophageal reflux disease without esophagitis 04/16/2018    Formatting of this note might be different from the original.   Stable  May 18, 2016 Entered By: WOLF CARDOSO Comment: recent onsetMay 12, 2023 Entered By: WOLF CARDOSO Comment: s/p dilation of striucture 10/7/22   • Hyperlipidemia 11/24/2021   • MDS (myelodysplastic syndrome), high grade (Multi) 07/02/2024   • Prediabetes 06/13/2015    Formatting of this note might be different from the original.   Hgb A1c = 6.l (6/2015)  Formatting of this note might be different from the original.   Managed with Diet & Exercise     Past Surgical History:   Procedure Laterality Date   • HERNIA REPAIR Left     Inguinal   • PROSTATECTOMY       Family History   Problem Relation Name Age of Onset   • Dementia Sister     • Stroke Sister     • Lung cancer Sister     • Bone cancer Brother     • No Known Problems Daughter Jane      Social History     Tobacco Use   • Smoking status: Former     Types: Cigarettes   • Smokeless tobacco: Never   Substance Use Topics   • Alcohol use: Not Currently   • Drug use: Never       Physical Exam   ED Triage Vitals [08/12/24 2031]   Temperature Heart Rate Respirations BP   37.4 °C (99.3 °F) (!) 109 18 (!) 144/99      Pulse Ox Temp Source Heart Rate Source Patient Position   98 % Temporal Monitor Sitting      BP Location FiO2 (%)     Right arm --       Physical Exam      ED Course & MDM   Diagnoses as of 08/12/24 2051   Diarrhea, unspecified type                 No data recorded     [place] Coma Scale Score: 15 (08/12/24 2035 : Gina Ludwig RN)                           Medical Decision Making      Procedure  Procedures      Ladonna Avery MD  08/12/24 8876

## 2024-10-10 DIAGNOSIS — D46.9 MDS (MYELODYSPLASTIC SYNDROME) (MULTI): ICD-10-CM

## 2024-10-14 NOTE — PROGRESS NOTES
[facility]           Radiation Oncology      [id number] [place], [place] [id number]        O: [phone]        F: [phone]       [facility]                   Dr. Jonatan Simpson MD PhD    FOLLOW-UP NOTE     Date of Service: 2024  Patient ID: Nicko Pierre   : 1950  MRN: [id number]   Acct Number: [id number]       NAME:  Nicko Pierre    :  1950 74 y.o. male     PCP: Blanca Landon MD    REFERRING PROVIDER: Lata    DIAGNOSIS:  1. Adenocarcinoma of prostate (HCC)        STAGING: Cancer Staging   Adenocarcinoma of prostate (HCC)  Staging form: Prostate, AJCC 7th Edition  - Pathologic: T3, N0, cM0 - Signed by Jonatan Simpson MD on 9/3/2022      DIAGNOSIS & STAGING: [place] 4+3=7 PSA 7, [id number]. PSA never vanished after 2010 prostatectomy with extra-prostatic extension and extensive positive margins, PSA slowly increasing to 0.26 in 2017, Lupron starting then.      PRIOR TREATMENT: 70.2 Gy delivered at 1.8 Gy per day, with concurrent Lupron (6 months duration).  Treatment Dates:  18 - 3/22/18     RECENT HISTORY: Nicko Pierre returns for follow up for the above diagnosis and treatment history.  His most recent PSA on 2023 was less than 0.01, undetectable.       Today he notes an IPSS score of 9 which is stable from prior visit.  His biggest complaint is frequency and nocturia 2-3 times nightly.  He was found to have [facility]plastic [facility] and was treated with aracytidine for which she developed constipation.  He is on MiraLAX for this with relief.  He will be having a stem cell transplantation in 6 weeks for further management of this.  On 2020 4 repeat PSA was less than 0.01.      He denies any fevers, chills, night with, nausea, vomiting, fatigue, or other complaint at this time.       Past medical, surgical, social and family histories reviewed and updated as indicated    ALLERGIES:

## 2024-10-18 ENCOUNTER — DOCUMENTATION (OUTPATIENT)
Dept: HEMATOLOGY/ONCOLOGY | Facility: HOSPITAL | Age: 74
End: 2024-10-18
Payer: MEDICARE

## 2024-10-18 NOTE — PROGRESS NOTES
10/18/24 1320  [facility] for [facility] to see Dr. Ibanez for MDS as a fuv. Scheduling called and left patient a VM on 10/11. I have called and left patient VM as well. We have updated the [facility] that patient has not returned our call to schedule. MARIA ISABEL Shah

## 2024-10-29 ENCOUNTER — OFFICE VISIT (OUTPATIENT)
Dept: HEMATOLOGY/ONCOLOGY | Facility: HOSPITAL | Age: 74
End: 2024-10-29
Payer: OTHER GOVERNMENT

## 2024-10-29 VITALS
HEART RATE: 48 BPM | SYSTOLIC BLOOD PRESSURE: 144 MMHG | WEIGHT: 151.9 LBS | DIASTOLIC BLOOD PRESSURE: 70 MMHG | BODY MASS INDEX: 23.1 KG/M2 | TEMPERATURE: 97.3 F | RESPIRATION RATE: 17 BRPM | OXYGEN SATURATION: 100 %

## 2024-10-29 DIAGNOSIS — D46.9 MDS (MYELODYSPLASTIC SYNDROME) (MULTI): ICD-10-CM

## 2024-10-29 PROCEDURE — 3077F SYST BP >= 140 MM HG: CPT | Performed by: INTERNAL MEDICINE

## 2024-10-29 PROCEDURE — 1159F MED LIST DOCD IN RCRD: CPT | Performed by: INTERNAL MEDICINE

## 2024-10-29 PROCEDURE — 3078F DIAST BP <80 MM HG: CPT | Performed by: INTERNAL MEDICINE

## 2024-10-29 PROCEDURE — 1123F ACP DISCUSS/DSCN MKR DOCD: CPT | Performed by: INTERNAL MEDICINE

## 2024-10-29 PROCEDURE — 1036F TOBACCO NON-USER: CPT | Performed by: INTERNAL MEDICINE

## 2024-10-29 PROCEDURE — 1126F AMNT PAIN NOTED NONE PRSNT: CPT | Performed by: INTERNAL MEDICINE

## 2024-10-29 PROCEDURE — 99215 OFFICE O/P EST HI 40 MIN: CPT | Performed by: INTERNAL MEDICINE

## 2024-10-29 PROCEDURE — 99215 OFFICE O/P EST HI 40 MIN: CPT | Mod: GC | Performed by: INTERNAL MEDICINE

## 2024-10-29 ASSESSMENT — PAIN SCALES - GENERAL: PAINLEVEL_OUTOF10: 0-NO PAIN

## 2024-11-13 ENCOUNTER — DOCUMENTATION (OUTPATIENT)
Dept: OTHER | Facility: HOSPITAL | Age: 74
End: 2024-11-13
Payer: MEDICARE

## 2024-11-13 NOTE — PROGRESS NOTES
11/13/24 8:45AM    Left  x2 for patient requesting a call back to discuss typing his nieces and nephews per our last conversation. Awaiting call back.    Samra Montalvo RN

## 2024-11-19 ENCOUNTER — DOCUMENTATION (OUTPATIENT)
Dept: OTHER | Facility: HOSPITAL | Age: 74
End: 2024-11-19
Payer: MEDICARE

## 2024-11-19 NOTE — PROGRESS NOTES
11/19/24 12:30PM    Called patient to explain to him that there are no matches in the donor registry for him, but Dr. Ibanez would like his nieces and nephews HLA-typed. Patient hesitant related to co-morbidities that his nieces and nephews have. I educated patient that they would be worked up with another doctor and coordinator to be evaluated as a donor and we would never put a donor at risk if they do come back as a half match. Patient verbalized understanding.  Patient will obtain his nieces and nephews typing and agreed that I could follow-up with him on Monday 11/25. Follow-up email sent to Dr. Chand, Dr. Ibanez, and Dr. Keys.     Samra Montalvo RN

## 2024-11-25 ENCOUNTER — DOCUMENTATION (OUTPATIENT)
Dept: OTHER | Facility: HOSPITAL | Age: 74
End: 2024-11-25
Payer: MEDICARE

## 2024-11-25 NOTE — PROGRESS NOTES
11/25/24 10:00AM    Patient called me explaining that he will have his nieces and nephews typing information to me by the end of the week and he needed more time to think about transplant. Patient has my contact information.    Samra Montalvo RN

## 2024-12-12 PROCEDURE — 99001 SPECIMEN HANDLING PT-LAB: CPT | Mod: OUT | Performed by: INTERNAL MEDICINE

## 2024-12-26 ENCOUNTER — LAB REQUISITION (OUTPATIENT)
Dept: LAB | Facility: CLINIC | Age: 74
End: 2024-12-26
Payer: MEDICARE

## 2024-12-26 DIAGNOSIS — D46.9 MYELODYSPLASTIC SYNDROME, UNSPECIFIED (MULTI): ICD-10-CM

## 2024-12-26 LAB
SPECIMEN HANDLING: NORMAL
SPECIMEN HANDLING: NORMAL

## 2025-01-02 ENCOUNTER — LAB REQUISITION (OUTPATIENT)
Dept: LAB | Facility: CLINIC | Age: 75
End: 2025-01-02
Payer: MEDICARE

## 2025-01-02 DIAGNOSIS — D46.9 MYELODYSPLASTIC SYNDROME, UNSPECIFIED (MULTI): ICD-10-CM

## 2025-03-11 ENCOUNTER — APPOINTMENT (OUTPATIENT)
Dept: RADIOLOGY | Facility: HOSPITAL | Age: 75
End: 2025-03-11
Payer: MEDICARE

## 2025-03-11 ENCOUNTER — HOSPITAL ENCOUNTER (EMERGENCY)
Facility: HOSPITAL | Age: 75
Discharge: HOME | End: 2025-03-12
Attending: EMERGENCY MEDICINE
Payer: MEDICARE

## 2025-03-11 ENCOUNTER — APPOINTMENT (OUTPATIENT)
Dept: CARDIOLOGY | Facility: HOSPITAL | Age: 75
End: 2025-03-11
Payer: MEDICARE

## 2025-03-11 VITALS
RESPIRATION RATE: 28 BRPM | WEIGHT: 142.6 LBS | HEART RATE: 95 BPM | SYSTOLIC BLOOD PRESSURE: 91 MMHG | DIASTOLIC BLOOD PRESSURE: 53 MMHG | OXYGEN SATURATION: 96 % | TEMPERATURE: 99.9 F | BODY MASS INDEX: 21.12 KG/M2 | HEIGHT: 69 IN

## 2025-03-11 DIAGNOSIS — I95.9 HYPOTENSION, UNSPECIFIED HYPOTENSION TYPE: ICD-10-CM

## 2025-03-11 DIAGNOSIS — A41.9 SEPSIS, DUE TO UNSPECIFIED ORGANISM, UNSPECIFIED WHETHER ACUTE ORGAN DYSFUNCTION PRESENT (MULTI): ICD-10-CM

## 2025-03-11 DIAGNOSIS — Z51.5 HOSPICE CARE PATIENT: ICD-10-CM

## 2025-03-11 DIAGNOSIS — D64.9 ANEMIA, UNSPECIFIED TYPE: ICD-10-CM

## 2025-03-11 DIAGNOSIS — R53.1 GENERALIZED WEAKNESS: Primary | ICD-10-CM

## 2025-03-11 DIAGNOSIS — N17.9 AKI (ACUTE KIDNEY INJURY) (CMS-HCC): ICD-10-CM

## 2025-03-11 LAB
ABO GROUP (TYPE) IN BLOOD: NORMAL
ABO GROUP (TYPE) IN BLOOD: NORMAL
ALBUMIN SERPL BCP-MCNC: 3.5 G/DL (ref 3.4–5)
ALP SERPL-CCNC: 101 U/L (ref 33–136)
ALT SERPL W P-5'-P-CCNC: 120 U/L (ref 10–52)
ANION GAP SERPL CALC-SCNC: 12 MMOL/L (ref 10–20)
ANTIBODY SCREEN: NORMAL
AST SERPL W P-5'-P-CCNC: 464 U/L (ref 9–39)
BILIRUB SERPL-MCNC: 1.1 MG/DL (ref 0–1.2)
BUN SERPL-MCNC: 39 MG/DL (ref 6–23)
CALCIUM SERPL-MCNC: 8.6 MG/DL (ref 8.6–10.3)
CARDIAC TROPONIN I PNL SERPL HS: 793 NG/L (ref 0–20)
CHLORIDE SERPL-SCNC: 107 MMOL/L (ref 98–107)
CO2 SERPL-SCNC: 25 MMOL/L (ref 21–32)
CREAT SERPL-MCNC: 2.57 MG/DL (ref 0.5–1.3)
EGFRCR SERPLBLD CKD-EPI 2021: 25 ML/MIN/1.73M*2
FLUAV RNA RESP QL NAA+PROBE: NOT DETECTED
FLUBV RNA RESP QL NAA+PROBE: NOT DETECTED
GLUCOSE SERPL-MCNC: 104 MG/DL (ref 74–99)
LACTATE SERPL-SCNC: 5.6 MMOL/L (ref 0.4–2)
POTASSIUM SERPL-SCNC: 5.1 MMOL/L (ref 3.5–5.3)
PROT SERPL-MCNC: 6.4 G/DL (ref 6.4–8.2)
RH FACTOR (ANTIGEN D): NORMAL
RH FACTOR (ANTIGEN D): NORMAL
S PYO DNA THROAT QL NAA+PROBE: NOT DETECTED
SARS-COV-2 RNA RESP QL NAA+PROBE: NOT DETECTED
SODIUM SERPL-SCNC: 139 MMOL/L (ref 136–145)

## 2025-03-11 PROCEDURE — 2500000001 HC RX 250 WO HCPCS SELF ADMINISTERED DRUGS (ALT 637 FOR MEDICARE OP): Performed by: EMERGENCY MEDICINE

## 2025-03-11 PROCEDURE — 80053 COMPREHEN METABOLIC PANEL: CPT | Performed by: EMERGENCY MEDICINE

## 2025-03-11 PROCEDURE — 96361 HYDRATE IV INFUSION ADD-ON: CPT

## 2025-03-11 PROCEDURE — 86850 RBC ANTIBODY SCREEN: CPT | Performed by: EMERGENCY MEDICINE

## 2025-03-11 PROCEDURE — 83605 ASSAY OF LACTIC ACID: CPT | Performed by: EMERGENCY MEDICINE

## 2025-03-11 PROCEDURE — 36415 COLL VENOUS BLD VENIPUNCTURE: CPT | Performed by: EMERGENCY MEDICINE

## 2025-03-11 PROCEDURE — 84484 ASSAY OF TROPONIN QUANT: CPT | Performed by: EMERGENCY MEDICINE

## 2025-03-11 PROCEDURE — 2500000004 HC RX 250 GENERAL PHARMACY W/ HCPCS (ALT 636 FOR OP/ED): Performed by: INTERNAL MEDICINE

## 2025-03-11 PROCEDURE — 96374 THER/PROPH/DIAG INJ IV PUSH: CPT

## 2025-03-11 PROCEDURE — 2500000001 HC RX 250 WO HCPCS SELF ADMINISTERED DRUGS (ALT 637 FOR MEDICARE OP): Performed by: INTERNAL MEDICINE

## 2025-03-11 PROCEDURE — 87040 BLOOD CULTURE FOR BACTERIA: CPT | Mod: ELYLAB | Performed by: EMERGENCY MEDICINE

## 2025-03-11 PROCEDURE — 87651 STREP A DNA AMP PROBE: CPT | Performed by: EMERGENCY MEDICINE

## 2025-03-11 PROCEDURE — 99291 CRITICAL CARE FIRST HOUR: CPT | Mod: 25 | Performed by: INTERNAL MEDICINE

## 2025-03-11 PROCEDURE — 93005 ELECTROCARDIOGRAM TRACING: CPT

## 2025-03-11 PROCEDURE — 85027 COMPLETE CBC AUTOMATED: CPT | Mod: ELYLAB | Performed by: EMERGENCY MEDICINE

## 2025-03-11 PROCEDURE — 2500000004 HC RX 250 GENERAL PHARMACY W/ HCPCS (ALT 636 FOR OP/ED): Performed by: EMERGENCY MEDICINE

## 2025-03-11 PROCEDURE — 87636 SARSCOV2 & INF A&B AMP PRB: CPT | Performed by: INTERNAL MEDICINE

## 2025-03-11 PROCEDURE — 71045 X-RAY EXAM CHEST 1 VIEW: CPT | Performed by: RADIOLOGY

## 2025-03-11 PROCEDURE — 71045 X-RAY EXAM CHEST 1 VIEW: CPT

## 2025-03-11 PROCEDURE — 85007 BL SMEAR W/DIFF WBC COUNT: CPT | Mod: ELYLAB | Performed by: EMERGENCY MEDICINE

## 2025-03-11 RX ORDER — ACETAMINOPHEN 325 MG/1
325 TABLET ORAL ONCE
Status: COMPLETED | OUTPATIENT
Start: 2025-03-11 | End: 2025-03-11

## 2025-03-11 RX ORDER — LEVOFLOXACIN 250 MG/1
750 TABLET ORAL ONCE
Status: DISCONTINUED | OUTPATIENT
Start: 2025-03-11 | End: 2025-03-12 | Stop reason: HOSPADM

## 2025-03-11 RX ORDER — ACETAMINOPHEN 325 MG/1
650 TABLET ORAL ONCE
Status: COMPLETED | OUTPATIENT
Start: 2025-03-11 | End: 2025-03-11

## 2025-03-11 RX ORDER — LEVOFLOXACIN 500 MG/1
500 TABLET, FILM COATED ORAL DAILY
Qty: 5 TABLET | Refills: 0 | Status: SHIPPED | OUTPATIENT
Start: 2025-03-11 | End: 2025-03-16

## 2025-03-11 RX ORDER — ONDANSETRON HYDROCHLORIDE 2 MG/ML
4 INJECTION, SOLUTION INTRAVENOUS ONCE
Status: COMPLETED | OUTPATIENT
Start: 2025-03-11 | End: 2025-03-11

## 2025-03-11 RX ORDER — IBUPROFEN 400 MG/1
400 TABLET ORAL ONCE
Status: COMPLETED | OUTPATIENT
Start: 2025-03-11 | End: 2025-03-11

## 2025-03-11 RX ADMIN — ONDANSETRON 4 MG: 2 INJECTION INTRAMUSCULAR; INTRAVENOUS at 20:27

## 2025-03-11 RX ADMIN — SODIUM CHLORIDE 1000 ML: 9 INJECTION, SOLUTION INTRAVENOUS at 20:13

## 2025-03-11 RX ADMIN — IBUPROFEN 400 MG: 400 TABLET, FILM COATED ORAL at 21:57

## 2025-03-11 RX ADMIN — ACETAMINOPHEN 325 MG: 325 TABLET ORAL at 21:57

## 2025-03-11 RX ADMIN — ACETAMINOPHEN 650 MG: 325 TABLET ORAL at 20:28

## 2025-03-11 RX ADMIN — SODIUM CHLORIDE, POTASSIUM CHLORIDE, SODIUM LACTATE AND CALCIUM CHLORIDE 1000 ML: 600; 310; 30; 20 INJECTION, SOLUTION INTRAVENOUS at 21:57

## 2025-03-11 ASSESSMENT — PAIN SCALES - GENERAL
PAINLEVEL_OUTOF10: 0 - NO PAIN

## 2025-03-11 ASSESSMENT — COLUMBIA-SUICIDE SEVERITY RATING SCALE - C-SSRS
6. HAVE YOU EVER DONE ANYTHING, STARTED TO DO ANYTHING, OR PREPARED TO DO ANYTHING TO END YOUR LIFE?: NO
1. IN THE PAST MONTH, HAVE YOU WISHED YOU WERE DEAD OR WISHED YOU COULD GO TO SLEEP AND NOT WAKE UP?: NO
2. HAVE YOU ACTUALLY HAD ANY THOUGHTS OF KILLING YOURSELF?: NO

## 2025-03-11 ASSESSMENT — PAIN - FUNCTIONAL ASSESSMENT: PAIN_FUNCTIONAL_ASSESSMENT: 0-10

## 2025-03-12 LAB
ATRIAL RATE: 109 BPM
ERYTHROCYTE [DISTWIDTH] IN BLOOD BY AUTOMATED COUNT: 14.9 % (ref 11.5–14.5)
HCT VFR BLD AUTO: 19.1 % (ref 41–52)
HGB BLD-MCNC: 6.5 G/DL (ref 13.5–17.5)
IMM GRANULOCYTES # BLD AUTO: 4.04 X10*3/UL (ref 0–0.5)
IMM GRANULOCYTES NFR BLD AUTO: 29.9 % (ref 0–0.9)
MCH RBC QN AUTO: 29 PG (ref 26–34)
MCHC RBC AUTO-ENTMCNC: 34 G/DL (ref 32–36)
MCV RBC AUTO: 85 FL (ref 80–100)
NRBC BLD-RTO: 0.3 /100 WBCS (ref 0–0)
P AXIS: 35 DEGREES
P OFFSET: 216 MS
P ONSET: 167 MS
PLATELET # BLD AUTO: 11 X10*3/UL (ref 150–450)
PR INTERVAL: 128 MS
Q ONSET: 231 MS
QRS COUNT: 18 BEATS
QRS DURATION: 84 MS
QT INTERVAL: 342 MS
QTC CALCULATION(BAZETT): 460 MS
QTC FREDERICIA: 417 MS
R AXIS: 35 DEGREES
RBC # BLD AUTO: 2.24 X10*6/UL (ref 4.5–5.9)
T AXIS: 60 DEGREES
T OFFSET: 402 MS
VENTRICULAR RATE: 109 BPM
WBC # BLD AUTO: 13.5 X10*3/UL (ref 4.4–11.3)

## 2025-03-12 NOTE — ED TRIAGE NOTES
Pt states he has been feeling more weak than normal. Wife states Pt had near syncopal episode sitting on the toilet tonight. Pt states he does not remember the incident. Denies any pain at triage. Pt states last had chemo last week.

## 2025-03-12 NOTE — ED PROVIDER NOTES
HPI   Chief Complaint   Patient presents with    Weakness, Gen       Patient is a 74-year-old male with history of prostate cancer hypothyroidism hypertension leukemia myelodysplastic syndrome, has not been feeling for the few days is a poor historian always has nausea, at this time was feeling little shaky and the wife is related to fall minutes in the toilet and he almost passed out.  Patient states he just feels weak and tired has no energy.              Patient History   Past Medical History:   Diagnosis Date    Acquired hypothyroidism 04/10/2017    Formatting of this note might be different from the original.   Stable on Levothyroxine    Adenocarcinoma of prostate (Multi) 11/23/2010    Formatting of this note might be different from the original.   [place] score = 7 (9/2010)  Formatting of this note might be different from the original.   Follows with Radiation Oncologist  Formatting of this note might be different from the original.   Formatting of this note might be different from the original.   [place] score = 7 (9/2010)  May 18, 2016 Entered By: WOLF CARDOSO Comment:    Benign essential HTN 11/25/2020    Gastroesophageal reflux disease without esophagitis 04/16/2018    Formatting of this note might be different from the original.   Stable  May 18, 2016 Entered By: WOLF CARDOSO Comment: recent onsetMay 12, 2023 Entered By: WOLF CARDOSO Comment: s/p dilation of striucture 10/7/22    Hyperlipidemia 11/24/2021    MDS (myelodysplastic syndrome), high grade (Multi) 07/02/2024    Prediabetes 06/13/2015    Formatting of this note might be different from the original.   Hgb A1c = 6.l (6/2015)  Formatting of this note might be different from the original.   Managed with Diet & Exercise     Past Surgical History:   Procedure Laterality Date    HERNIA REPAIR Left     Inguinal    PROSTATECTOMY       Family History   Problem Relation Name Age of Onset    Dementia Sister      Stroke Sister      Lung  cancer Sister      Bone cancer Brother      No Known Problems Daughter Jane      Social History     Tobacco Use    Smoking status: Former     Types: Cigarettes    Smokeless tobacco: Never   Substance Use Topics    Alcohol use: Not Currently    Drug use: Never       Physical Exam   ED Triage Vitals [03/11/25 2001]   Temperature Heart Rate Respirations BP   (!) 39.9 °C (103.8 °F) (!) 110 (!) 28 129/59      Pulse Ox Temp Source Heart Rate Source Patient Position   95 % Temporal Monitor Sitting      BP Location FiO2 (%)     Right arm --       Physical Exam  Vitals reviewed.   Constitutional:       Appearance: He is ill-appearing.   HENT:      Mouth/Throat:      Mouth: Mucous membranes are dry.   Cardiovascular:      Rate and Rhythm: Regular rhythm. Tachycardia present.   Pulmonary:      Effort: Pulmonary effort is normal.   Abdominal:      Palpations: Abdomen is soft.   Musculoskeletal:         General: Normal range of motion.   Skin:     General: Skin is warm and dry.   Neurological:      General: No focal deficit present.      Mental Status: He is alert and oriented to person, place, and time.           ED Course & MDM   Diagnoses as of 03/11/25 2058   Generalized weakness                 No data recorded     [place] Coma Scale Score: 15 (03/11/25 2011 : David Brown RN)                           Medical Decision Making  EKG interpreted by me shows sinus tachycardia heart rate of 109, normal QRS and normal ST segments  My differential diagnosis  Acute sepsis acute COVID infection acute influenza acute pneumonia acute UTI acute dehydration acute electrolyte imbalance  I started a code sepsis on the patient and also ordered CBC chemistry chest x-ray urinalysis COVID influenza swab and p.o. Tylenol IV fluids.  Will review the patient once the results are obtained  The wife came little bit later and told me that normal chemo for the patient's last chemo was week and a half ago and he is not getting any more chemo  nothing is helping him and is on hospice since last Sunday  At this time all the labs are pending patient is signed out to the incoming provider, Dr. Morales for further workup and management and disposition.        Procedure  Procedures     Marlin Colon MD  03/11/25 2058

## 2025-03-12 NOTE — ED NOTES
Lab called a critical result confirmed by pathology at 12:37 pm on 03/12/2025.  Initial CBC resulted 03/11/2025 and was reviewed by physician at time of visit.  Concern regarding low hemoglobin was reviewed by physician prior to discharge.  Patient voiced understanding of his testing and declined admission and requested to go back home to hospice.  Spoke with Dr Manriquez today and he states no further action required regarding critical lab result of hemogloin of 6.5 and Platelets of 11.    Tila Olmos RN BSN

## 2025-03-12 NOTE — PROGRESS NOTES
Emergency Medicine Transition of Care Note.    I received Jose Mahoney in signout from Dr. Colon.  Please see the previous ED provider note for all HPI, PE and MDM up to the time of signout at 2100. This is in addition to the primary record.    In brief Jose Mahoney is an 74 y.o. male presenting for   Chief Complaint   Patient presents with    Weakness, Gen     At the time of signout we were awaiting: Labs and further discussion with family    ED Course as of 03/11/25 2336   Tue Mar 11, 2025   2131 Labs resulted showing elevated troponin and lactic acid.  Acute kidney injury.  Concern for sepsis.  Discussed with the patient and his wife at length.  I have indicated to him that the current disease processes will likely end his life.  Patient wishes to return to hospice care at home.  I discussed with the patient that he would likely pass away from sepsis at home.  Patient does not wish to be admitted to the hospital.  The patient and his wife are in agreement.  Will provide additional antipyretics and fluids as well as antibiotics for home. [JA]      ED Course User Index  [JA] Adolfo Luther DO         Diagnoses as of 03/11/25 2336   Generalized weakness   Sepsis, due to unspecified organism, unspecified whether acute organ dysfunction present (Multi)   JOSEPHINE (acute kidney injury) (CMS-[facility])   Anemia, unspecified type   Hypotension, unspecified hypotension type   Hospice care patient       Medical Decision Making  Differential diagnosis: Sepsis, dehydration, acute kidney injury, viral infection, anemia, other    Patient has known myelodysplastic syndrome and presents for generalized weakness.  Patient was evaluated and workup started by previous provider.  Upon arrival with patient's wife she indicated that he is on hospice currently.  Labs showing significant abnormalities including dehydration, anemia, concern for sepsis.  Patient receiving IV fluids and antipyretics.  Fever improving.  Patient is hypotensive.   Discussed with the patient and family as to his options including admission for further evaluation or returning home to hospice.  I have indicated to the patient and family that he will likely pass away from the current disease process should he return home.  Patient indicates he wishes to return to hospice.  Patient is aware that he may pass away at home.  Patient and family are agreeable with plan.    For patient's comfort I have provided an additional liter of IV fluids as well as additional antipyretics.  Will treat with p.o. antibiotics.  Patient has an allergy to penicillin.  Will treat with renally dosed Levaquin.        Final diagnoses:   [R53.1] Generalized weakness   [A41.9] Sepsis, due to unspecified organism, unspecified whether acute organ dysfunction present (Multi)   [N17.9] JOSEPHINE (acute kidney injury) (CMS-HCC)   [D64.9] Anemia, unspecified type   [I95.9] Hypotension, unspecified hypotension type   [Z51.5] Hospice care patient           Procedure  Critical Care    Performed by: Adolfo Luther DO  Authorized by: Adolfo Luther DO    Critical care provider statement:     Critical care time (minutes):  35    Critical care time was exclusive of:  Separately billable procedures and treating other patients    Critical care was necessary to treat or prevent imminent or life-threatening deterioration of the following conditions:  Circulatory failure    Critical care was time spent personally by me on the following activities:  Ordering and performing treatments and interventions, ordering and review of laboratory studies, ordering and review of radiographic studies, pulse oximetry, re-evaluation of patient's condition, evaluation of patient's response to treatment and development of treatment plan with patient or surrogate      Adolfo Luther DO

## 2025-03-12 NOTE — ED NOTES
Patient's CBC sent to pathology, lab called today 03/12/25 at 12:37 with a confirmation and critical result of hemoglobin of 6.5 and platelets of 11. Initial CBC reviewed by discharging doctor 03/11/25, patient and family aware and patient declined admission, requesting to go back home to home hospice.  Results discussed today with Dr Manriquez, no further action required, this was confirmation from pathology.      Tila Olmos RN BSN     Tila Olmos RN  03/12/25 9150

## 2025-03-13 LAB
BASOPHILS # BLD MANUAL: 0 X10*3/UL (ref 0–0.1)
BASOPHILS NFR BLD MANUAL: 0 %
BLASTS # BLD MANUAL: 4.05 X10*3/UL
BLASTS NFR BLD MANUAL: 30 %
BURR CELLS BLD QL SMEAR: ABNORMAL
EOSINOPHIL # BLD MANUAL: 0 X10*3/UL (ref 0–0.4)
EOSINOPHIL NFR BLD MANUAL: 0 %
HYPOCHROMIA BLD QL SMEAR: ABNORMAL
LYMPHOCYTES # BLD MANUAL: 3.38 X10*3/UL (ref 0.8–3)
LYMPHOCYTES NFR BLD MANUAL: 25 %
MONOCYTES # BLD MANUAL: 0.54 X10*3/UL (ref 0.05–0.8)
MONOCYTES NFR BLD MANUAL: 4 %
NEUTROPHILS # BLD MANUAL: 1.35 X10*3/UL (ref 1.6–5.5)
NEUTS BAND # BLD MANUAL: 0.27 X10*3/UL (ref 0–0.5)
NEUTS BAND NFR BLD MANUAL: 2 %
NEUTS SEG # BLD MANUAL: 1.08 X10*3/UL (ref 1.6–5)
NEUTS SEG NFR BLD MANUAL: 8 %
PATH REVIEW-CBC DIFFERENTIAL: NORMAL
POLYCHROMASIA BLD QL SMEAR: ABNORMAL
RBC MORPH BLD: ABNORMAL
TOTAL CELLS COUNTED BLD: 100
VARIANT LYMPHS # BLD MANUAL: 4.19 X10*3/UL (ref 0–0.3)
VARIANT LYMPHS NFR BLD: 31 %

## 2025-03-16 LAB
BACTERIA BLD CULT: NORMAL
BACTERIA BLD CULT: NORMAL